# Patient Record
Sex: MALE | Race: WHITE | NOT HISPANIC OR LATINO | Employment: OTHER | URBAN - METROPOLITAN AREA
[De-identification: names, ages, dates, MRNs, and addresses within clinical notes are randomized per-mention and may not be internally consistent; named-entity substitution may affect disease eponyms.]

---

## 2017-04-03 ENCOUNTER — ALLSCRIPTS OFFICE VISIT (OUTPATIENT)
Dept: OTHER | Facility: OTHER | Age: 42
End: 2017-04-03

## 2017-04-03 DIAGNOSIS — M54.50 LOW BACK PAIN: ICD-10-CM

## 2017-04-03 DIAGNOSIS — E66.01 MORBID (SEVERE) OBESITY DUE TO EXCESS CALORIES (HCC): ICD-10-CM

## 2017-04-03 DIAGNOSIS — I10 ESSENTIAL (PRIMARY) HYPERTENSION: ICD-10-CM

## 2017-04-03 DIAGNOSIS — E78.5 HYPERLIPIDEMIA: ICD-10-CM

## 2017-05-22 ENCOUNTER — ALLSCRIPTS OFFICE VISIT (OUTPATIENT)
Dept: OTHER | Facility: OTHER | Age: 42
End: 2017-05-22

## 2018-01-12 VITALS
HEIGHT: 72 IN | OXYGEN SATURATION: 98 % | SYSTOLIC BLOOD PRESSURE: 150 MMHG | HEART RATE: 81 BPM | RESPIRATION RATE: 18 BRPM | BODY MASS INDEX: 42.66 KG/M2 | DIASTOLIC BLOOD PRESSURE: 100 MMHG | WEIGHT: 315 LBS | TEMPERATURE: 96.7 F

## 2018-01-14 NOTE — PROGRESS NOTES
Chief Complaint  CC patient here today for blood pressure check      Active Problems    1  Chronic low back pain (724 2,338 29) (M54 5,G89 29)   2  Hyperlipidemia (272 4) (E78 5)   3  Hypertension (401 9) (I10)   4  Morbid obesity (278 01) (E66 01)   5  Pain of left heel (729 5) (M79 672)   6  Seborrheic dermatitis (690 10) (L21 9)   7  Snoring (786 09) (R06 83)   8  Tear of right biceps muscle (840 8) (S46 111A)   9  Umbilical hernia (483 4) (K42 9)    Current Meds   1  HydroCHLOROthiazide 25 MG Oral Tablet; Take 1 tablet daily; Therapy: 71LQS3761 to (Evaluate:07Jan2017)  Requested for: 38TTQ7190; Last   Rx:[id number] Ordered   2  Lisinopril-Hydrochlorothiazide 20-25 MG Oral Tablet; TAKE 1 TABLET DAILY; Therapy: 62QNO3514 to (Evaluate:29Mar2018)  Requested for: 03Apr2017; Last   Rx:[id number] Ordered    Allergies    1  FRUIT    Vitals  Signs    Systolic: 705, RUE, Sitting  Diastolic: 839, RUE, Sitting   Systolic: 324, LUE, Sitting  Diastolic: 992, LUE, Sitting    Assessment    1  Hypertension (401 9) (I10)    Discussion/Summary    Blood pressure more elevated than goal  Would recommend changing blood pressure medication  He is concerned that his blood pressure will then go to low  On the hotter days he noticed his blood pressure had been running relatively low  We'll have him continue to watch his blood pressure at home and bring a list of his blood pressures in in about 2 weeks  If his blood pressure remains elevated at that point, would recommend switching to different blood pressure medication or titrating the dose of this one  Future Appointments    Date/Time Provider Specialty Site   10/03/2017 07:40 AM SAVANNAH Frausto   [address] [place] [address]     Signatures   Electronically signed by : SAVANNAH Chance ; May 22 2017 10:38PM EST                       (Author)

## 2018-01-22 VITALS — SYSTOLIC BLOOD PRESSURE: 148 MMHG | DIASTOLIC BLOOD PRESSURE: 100 MMHG

## 2018-09-07 ENCOUNTER — OFFICE VISIT (OUTPATIENT)
Dept: URGENT CARE | Facility: CLINIC | Age: 43
End: 2018-09-07

## 2018-09-07 VITALS
SYSTOLIC BLOOD PRESSURE: 168 MMHG | RESPIRATION RATE: 20 BRPM | DIASTOLIC BLOOD PRESSURE: 110 MMHG | TEMPERATURE: 97.4 F | WEIGHT: 315 LBS | HEIGHT: 70 IN | BODY MASS INDEX: 45.1 KG/M2 | HEART RATE: 106 BPM | OXYGEN SATURATION: 96 %

## 2018-09-07 DIAGNOSIS — H66.91 RIGHT OTITIS MEDIA, UNSPECIFIED OTITIS MEDIA TYPE: Primary | ICD-10-CM

## 2018-09-07 PROCEDURE — 99203 OFFICE O/P NEW LOW 30 MIN: CPT

## 2018-09-07 RX ORDER — AMOXICILLIN 500 MG/1
500 CAPSULE ORAL EVERY 8 HOURS SCHEDULED
Qty: 21 CAPSULE | Refills: 0 | Status: SHIPPED | OUTPATIENT
Start: 2018-09-07 | End: 2018-09-14

## 2018-09-07 NOTE — PROGRESS NOTES
[facility]        NAME: Edwar Reilly is a 40 y o  male  : 1975    MRN: [id number]  DATE: 2018  TIME: 3:53 PM    Assessment and Plan   Right otitis media, unspecified otitis media type [H66 91]  1  Right otitis media, unspecified otitis media type  amoxicillin (AMOXIL) 500 mg capsule         Patient Instructions     Right otitis media  Amoxicillin as directed  Follow up with PCP in 3-5 days  Proceed to  ER if symptoms worsen  Chief Complaint     Chief Complaint   Patient presents with    Earache     C/O pain in right ear x 4 days  History of Present Illness       Patient c/o right ear pain x 4 days  Patient denies fever, chills, n/v         Review of Systems   Review of Systems   HENT: Positive for ear pain  Negative for congestion, ear discharge, facial swelling, sinus pain, sinus pressure and sore throat  Respiratory: Negative  Cardiovascular: Negative  Current Medications       Current Outpatient Prescriptions:     amoxicillin (AMOXIL) 500 mg capsule, Take 1 capsule (500 mg total) by mouth every 8 (eight) hours for 7 days, Disp: 21 capsule, Rfl: 0    Current Allergies     Allergies as of 2018 - Reviewed 2018   Allergen Reaction Noted    Ascorbate Anaphylaxis 2016    Fruit extracts  2014            The following portions of the patient's history were reviewed and updated as appropriate: allergies, current medications, past family history, past medical history, past social history, past surgical history and problem list      Past Medical History:   Diagnosis Date    Hypertension        History reviewed  No pertinent surgical history  No family history on file  Medications have been verified          Objective   BP (!) 168/110   Pulse (!) 106   Temp (!) 97 4 °F (36 3 °C) (Tympanic)   Resp 20   Ht 5' 10" (1 778 m)   Wt (!) 144 kg (318 lb)   SpO2 96%   BMI 45 63 kg/m²        Physical Exam     Physical Exam Constitutional: He appears well-developed and well-nourished  No distress  HENT:   Head: Normocephalic and atraumatic  Right Ear: Hearing, tympanic membrane and ear canal normal  There is swelling and tenderness  Left Ear: Hearing, tympanic membrane, external ear and ear canal normal  No swelling or tenderness  Tympanic membrane is not injected, not scarred, not perforated, not erythematous and not retracted  Mouth/Throat: Uvula is midline, oropharynx is clear and moist and mucous membranes are normal    Eyes: Conjunctivae and EOM are normal  Pupils are equal, round, and reactive to light  Neck: Normal range of motion  Neck supple  Cardiovascular: Normal rate, regular rhythm, normal heart sounds and intact distal pulses  Pulmonary/Chest: Effort normal and breath sounds normal    Lymphadenopathy:     He has cervical adenopathy  Skin: He is not diaphoretic

## 2018-09-07 NOTE — PATIENT INSTRUCTIONS
Right otitis media  Amoxicillin as directed  Follow up with PCP in 3-5 days  Proceed to  ER if symptoms worsen    Otitis Media   WHAT YOU NEED TO KNOW:   Otitis media is an ear infection  DISCHARGE INSTRUCTIONS:   Medicines:  · Ibuprofen or acetaminophen  helps decrease your pain and fever  They are available without a doctor's order  Ask your healthcare provider which medicine is right for you  Ask how much to take and how often to take it  These medicines can cause stomach bleeding if not taken correctly  Ibuprofen can cause kidney damage  Do not take ibuprofen if you have kidney disease, an ulcer, or allergies to aspirin  Acetaminophen can cause liver damage  Do not drink alcohol if you take acetaminophen  · Ear drops  help treat your ear pain  · Antibiotics  help treat a bacterial infection that caused your ear infection  · Take your medicine as directed  Contact your healthcare provider if you think your medicine is not helping or if you have side effects  Tell him or her if you are allergic to any medicine  Keep a list of the medicines, vitamins, and herbs you take  Include the amounts, and when and why you take them  Bring the list or the pill bottles to follow-up visits  Carry your medicine list with you in case of an emergency  Heat or ice:   · Heat  may be used to decrease your pain  Place a warm, moist washcloth on your ear  Apply for 15 to 20 minutes, 3 to 4 times a day    · Ice  helps decrease swelling and pain  Use an ice pack or put crushed ice in a plastic bag  Cover the ice pack with a towel and place it on your ear for 15 to 20 minutes, 3 to 4 times a day for 2 days  Prevent otitis media:   · Wash your hands often  Use soap and water  Wash your hands after you use the bathroom, change a child's diapers, or sneeze  Wash your hands before you prepare or eat food  · Stay away from people who are ill  Some germs are easily and quickly spread through contact    Return to work or school: You may return to work or school when your fever is gone  Follow up with your healthcare provider as directed:  Write down your questions so you remember to ask them during your visits  Contact your healthcare provider if:   · Your ear pain gets worse or does not go away, even after treatment  · The outside of your ear is red or swollen  · You have vomiting or diarrhea  · You have fluid coming from your ear  · You have questions or concerns about your condition or care  Return to the emergency department if:   · You have a seizure  · You have a fever and a stiff neck  © 2017 [facility] Information is for End User's use only and may not be sold, redistributed or otherwise used for commercial purposes  All illustrations and images included in CareNotes® are the copyrighted property of A D A M [employer] Inc  or Shyam Watson  The above information is an  only  It is not intended as medical advice for individual conditions or treatments  Talk to your doctor, nurse or pharmacist before following any medical regimen to see if it is safe and effective for you

## 2020-10-09 PROBLEM — G89.29 CHRONIC LOW BACK PAIN: Status: ACTIVE | Noted: 2017-04-03

## 2020-10-09 PROBLEM — M54.50 CHRONIC LOW BACK PAIN: Status: ACTIVE | Noted: 2017-04-03

## 2020-10-10 ENCOUNTER — OFFICE VISIT (OUTPATIENT)
Dept: INTERNAL MEDICINE CLINIC | Facility: CLINIC | Age: 45
End: 2020-10-10

## 2020-10-10 VITALS
SYSTOLIC BLOOD PRESSURE: 182 MMHG | WEIGHT: 315 LBS | OXYGEN SATURATION: 97 % | HEIGHT: 72 IN | BODY MASS INDEX: 42.66 KG/M2 | HEART RATE: 88 BPM | DIASTOLIC BLOOD PRESSURE: 102 MMHG | TEMPERATURE: 97.3 F

## 2020-10-10 DIAGNOSIS — I10 ESSENTIAL HYPERTENSION: Primary | ICD-10-CM

## 2020-10-10 DIAGNOSIS — E78.2 MIXED HYPERLIPIDEMIA: ICD-10-CM

## 2020-10-10 DIAGNOSIS — E66.01 MORBID OBESITY WITH BMI OF 45.0-49.9, ADULT (HCC): ICD-10-CM

## 2020-10-10 PROCEDURE — 99203 OFFICE O/P NEW LOW 30 MIN: CPT | Performed by: FAMILY MEDICINE

## 2020-10-10 RX ORDER — HYDROCHLOROTHIAZIDE 25 MG/1
25 TABLET ORAL DAILY
Qty: 90 TABLET | Refills: 5 | Status: SHIPPED | OUTPATIENT
Start: 2020-10-10 | End: 2021-09-23

## 2021-09-23 ENCOUNTER — OFFICE VISIT (OUTPATIENT)
Dept: INTERNAL MEDICINE CLINIC | Facility: CLINIC | Age: 46
End: 2021-09-23
Payer: COMMERCIAL

## 2021-09-23 VITALS
HEART RATE: 95 BPM | HEIGHT: 72 IN | WEIGHT: 315 LBS | BODY MASS INDEX: 42.66 KG/M2 | DIASTOLIC BLOOD PRESSURE: 116 MMHG | OXYGEN SATURATION: 95 % | SYSTOLIC BLOOD PRESSURE: 224 MMHG | TEMPERATURE: 97.7 F

## 2021-09-23 DIAGNOSIS — I10 ESSENTIAL HYPERTENSION: Primary | ICD-10-CM

## 2021-09-23 DIAGNOSIS — E78.2 MIXED HYPERLIPIDEMIA: ICD-10-CM

## 2021-09-23 DIAGNOSIS — E66.01 MORBID OBESITY (HCC): ICD-10-CM

## 2021-09-23 PROCEDURE — 3725F SCREEN DEPRESSION PERFORMED: CPT | Performed by: FAMILY MEDICINE

## 2021-09-23 PROCEDURE — 99214 OFFICE O/P EST MOD 30 MIN: CPT | Performed by: FAMILY MEDICINE

## 2021-09-23 PROCEDURE — 1036F TOBACCO NON-USER: CPT | Performed by: FAMILY MEDICINE

## 2021-09-23 RX ORDER — LISINOPRIL AND HYDROCHLOROTHIAZIDE 25; 20 MG/1; MG/1
1 TABLET ORAL DAILY
Qty: 90 TABLET | Refills: 1 | Status: SHIPPED | OUTPATIENT
Start: 2021-09-23 | End: 2021-10-01 | Stop reason: SDUPTHER

## 2021-09-23 NOTE — PATIENT INSTRUCTIONS
the following:  · Headache    · Blurred vision    · Chest pain    · Dizziness or weakness    · Trouble breathing    · Nosebleeds    How is hypertension diagnosed? Your healthcare provider will take your blood pressure at several visits  You may also need to check your blood pressure at home  The provider will examine you and ask what medicines you take  He or she will also ask if you have a family history of high blood pressure and about any health conditions you have  He or she will also check your blood pressure and weight and examine your heart, lungs, and eyes  You may need any of the following tests:  · An ambulatory blood pressure monitor (ABPM)  is a device that you wear  ABPM measures your blood pressure while you do your regular daily activities  It records your blood pressure every 15 to 30 minutes during the day  It also records your blood pressure every 15 minutes to 1 hour at night  The recorded blood pressures help your healthcare provider know if you have hypertension not seen at your appointment  · Blood tests  may help healthcare providers find the cause of your hypertension  Blood tests can also help find other health problems caused by hypertension  · Urine tests  will be done to check your kidney function  Kidney problems can increase your risk for hypertension  Which medicines are used to treat hypertension? · Antihypertensives  may be used to help lower your blood pressure  Several kinds of medicines are available  Your healthcare provider will choose medicines based on the kind of hypertension you have  You may need more than one type of medicine  Take the medicine exactly as directed  · Diuretics  help decrease extra fluid that collects in your body  This will help lower your blood pressure  You may urinate more often while you take this medicine  · Cholesterol medicine  helps lower your cholesterol level   A low cholesterol level helps prevent heart disease and makes it easier to control your blood pressure  What can I do to manage hypertension? · Check your blood pressure at home  Avoid smoking, caffeine, and exercise at least 30 minutes before checking your blood pressure  Sit and rest for 5 minutes before you take your blood pressure  Extend your arm and support it on a flat surface  Your arm should be at the same level as your heart  Follow the directions that came with your blood pressure monitor  Check your blood pressure 2 times, 1 minute apart, before you take your medicine in the morning  Also check your blood pressure before your evening meal  Keep a record of your readings and bring it to your follow-up visits  Ask your healthcare provider what your blood pressure should be  · Manage any other health conditions you have  Health conditions such as diabetes can increase your risk for hypertension  Follow your healthcare provider's instructions and take all your medicines as directed  · Ask about all medicines  Certain medicines can increase your blood pressure  Examples include oral birth control pills, decongestants, herbal supplements, and NSAIDs, such as ibuprofen  Your healthcare provider can tell you which medicines are safe for you to take  This includes prescription and over-the-counter medicines  What lifestyle changes can I make to manage hypertension? Your healthcare provider may recommend you work with a team to manage hypertension  The team may include medical experts such as a dietitian, an exercise or physical therapist, and a behavior therapist  Your family members may be included in helping you create lifestyle changes  · Limit sodium (salt) as directed  Too much sodium can affect your fluid balance  Check labels to find low-sodium or no-salt-added foods  Some low-sodium foods use potassium salts for flavor  Too much potassium can also cause health problems   Your healthcare provider will tell you how much sodium and potassium are safe for you to have in a day  He or she may recommend that you limit sodium to 2,300 mg a day  · Follow the meal plan recommended by your healthcare provider  A dietitian or your provider can give you more information on low-sodium plans or the DASH (Dietary Approaches to Stop Hypertension) eating plan  The DASH plan is low in sodium, processed sugar, unhealthy fats, and total fat  It is high in potassium, calcium, and fiber  These can be found in vegetables, fruit, and whole-grain foods  · Be physically active throughout the day  Physical activity, such as exercise, can help control your blood pressure and your weight  Be physically active for at least 30 minutes per day, on most days of the week  Include aerobic activity, such as walking or riding a bicycle  Also include strength training at least 2 times each week  Your healthcare providers can help you create a physical activity plan  · Decrease stress  This may help lower your blood pressure  Learn ways to relax, such as deep breathing or listening to music  · Limit alcohol as directed  Alcohol can increase your blood pressure  A drink of alcohol is 12 ounces of beer, 5 ounces of wine, or 1½ ounces of liquor  · Do not smoke  Nicotine and other chemicals in cigarettes and cigars can increase your blood pressure and also cause lung damage  Ask your healthcare provider for information if you currently smoke and need help to quit  E-cigarettes or smokeless tobacco still contain nicotine  Talk to your healthcare provider before you use these products  Call your local emergency number (911 in the [address] [place] [address],[address]) or have someone call if:   · You have chest pain  · You have any of the following signs of a heart attack:      ?  Squeezing, pressure, or pain in your chest    ? You may  also have any of the following:     § Discomfort or pain in your back, neck, jaw, stomach, or arm    § Shortness of breath    § Nausea or vomiting    § Lightheadedness or a sudden cold sweat    · You become confused or have trouble speaking  · You suddenly feel lightheaded or have trouble breathing  When should I seek immediate care? · You have a severe headache or vision loss  · You have weakness in an arm or leg  When should I call my doctor? · You feel faint, dizzy, confused, or drowsy  · You have been taking your blood pressure medicine but your pressure is higher than your provider says it should be  · You have questions or concerns about your condition or care  CARE AGREEMENT:   You have the right to help plan your care  Learn about your health condition and how it may be treated  Discuss treatment options with your healthcare providers to decide what care you want to receive  You always have the right to refuse treatment  The above information is an  only  It is not intended as medical advice for individual conditions or treatments  Talk to your doctor, nurse or pharmacist before following any medical regimen to see if it is safe and effective for you  © Copyright [employer] 2021 Information is for End User's use only and may not be sold, redistributed or otherwise used for commercial purposes   All illustrations and images included in CareNotes® are the copyrighted property of A D A M , Inc  or [address] [place] [address]

## 2021-09-23 NOTE — PROGRESS NOTES
Assessment/Plan:    No problem-specific Assessment & Plan notes found for this encounter  Diagnoses and all orders for this visit:    Essential hypertension  -     CBC and differential; Future  -     Comprehensive metabolic panel; Future  -     Lipid panel; Future  -     TSH, 3rd generation; Future  -     lisinopril-hydrochlorothiazide (PRINZIDE,ZESTORETIC) 20-25 MG per tablet; Take 1 tablet by mouth daily    Mixed hyperlipidemia  -     CBC and differential; Future  -     Comprehensive metabolic panel; Future  -     Lipid panel; Future  -     TSH, 3rd generation; Future    Morbid obesity (HCC)  -     CBC and differential; Future  -     Comprehensive metabolic panel; Future  -     Lipid panel; Future  -     TSH, 3rd generation; Future       Orders recommendations as noted above  Discussed with him the importance of following up more regularly  Discussed the importance of getting laboratory testing completed since it has been quite awhile and especially with his blood pressure being so significantly elevated  Follow blood pressure at home  Warning signs and symptoms of blood pressure abnormalities discussed  Advised him not to stop medications without 1st contacting the office since significantly elevated blood pressures to carry risk  Discussed with him that with his blood pressure being this elevated emergency room evaluation could be indicated  He would like to avoid this if possible  Will start him on the lisinopril / hydrochlorothiazide  Watch salt intake  Stay adequately hydrated  Cholesterol has been elevated in the past   Advised him to get laboratory testing done over the next week as noted above  Weight loss would definitely help with his underlying blood pressure issues  Slow but steady weight loss recommended  Flu shot and COVID vaccination discussed  The significant benefits of these discussed  He adamantly declines these, however    Will have him follow up for blood pressure check over the next week  Advised him to call us with blood pressure readings at home as well  Subjective:      Patient ID: Basilio Patrick is a 41 y o  male  He presents for follow-up on blood pressure  He was seen about 11 months ago and blood pressure was significantly elevated at that time  He had been off of his blood pressure medications  These were resumed  He had than been having persistently elevated blood pressures and amlodipine was added  He began to feel increasing fatigue and orthostatics symptoms  He had discontinued the medication  He has not been taking anything for his blood pressure for months  Occasional headaches but not severe  Denies any numbness, tingling, or weakness  Denies any vision changes  Denies any abdominal pain  Denies any chest pain or palpitations  Denies any significant shortness of breath  Appetite has been stable  Denies any nausea, vomiting, or diarrhea  Denies any changes in bowel movements  The following portions of the patient's history were reviewed and updated as appropriate:   He  has a past medical history of Hypertension  He   Patient Active Problem List    Diagnosis Date Noted    Chronic low back pain 04/03/2017    Hyperlipidemia 09/13/2016    Morbid obesity ([address] ) 03/21/2016    Seborrheic dermatitis 03/21/2016    Hypertension 04/28/2014     He  has no past surgical history on file  His family history is not on file  He  reports that he has never smoked  He has never used smokeless tobacco  No history on file for alcohol use and drug use  Current Outpatient Medications   Medication Sig Dispense Refill    lisinopril-hydrochlorothiazide (PRINZIDE,ZESTORETIC) 20-25 MG per tablet Take 1 tablet by mouth daily 90 tablet 1     No current facility-administered medications for this visit       Current Outpatient Medications on File Prior to Visit   Medication Sig    [DISCONTINUED] hydrochlorothiazide (HYDRODIURIL) 25 mg tablet Take 1 tablet (25 mg total) by mouth daily (Patient not taking: Reported on 9/23/2021)     No current facility-administered medications on file prior to visit  He is allergic to ascorbate - food allergy and fruit extracts       Review of Systems   Constitutional: Negative for activity change, appetite change, chills and fever  HENT: Negative for hearing loss  Eyes: Negative for pain and visual disturbance  Respiratory: Negative for cough, chest tightness and shortness of breath  Cardiovascular: Negative for chest pain and palpitations  Gastrointestinal: Negative for abdominal pain, blood in stool, diarrhea, nausea and vomiting  Endocrine: Negative for polydipsia, polyphagia and polyuria  Genitourinary: Negative for dysuria  Musculoskeletal: Negative for arthralgias and gait problem  Skin: Negative for color change  Neurological: Negative for dizziness, tremors, speech difficulty, weakness, light-headedness, numbness and headaches  Occasional headaches   Hematological: Does not bruise/bleed easily  Psychiatric/Behavioral: Negative for behavioral problems, decreased concentration and dysphoric mood  The patient is not nervous/anxious  Objective:      BP (!) 224/116 (BP Location: Left arm, Patient Position: Sitting, Cuff Size: Large)   Pulse 95   Temp 97 7 °F (36 5 °C)   Ht 6' (1 829 m)   Wt (!) 162 kg (356 lb 12 8 oz)   SpO2 95%   BMI 48 39 kg/m²          Physical Exam  Vitals and nursing note reviewed  Constitutional:       General: He is not in acute distress  Appearance: He is well-developed and well-groomed  He is morbidly obese  HENT:      Head: Normocephalic and atraumatic  Nose: Nose normal    Eyes:      Conjunctiva/sclera: Conjunctivae normal       Pupils: Pupils are equal, round, and reactive to light  Neck:      Vascular: No carotid bruit  Cardiovascular:      Rate and Rhythm: Normal rate and regular rhythm  Heart sounds: Normal heart sounds   No murmur heard    No friction rub  No gallop  Pulmonary:      Breath sounds: No wheezing or rales  Chest:      Chest wall: No tenderness  Abdominal:      General: There is no distension  Tenderness: There is no abdominal tenderness  There is no guarding or rebound  Lymphadenopathy:      Cervical: No cervical adenopathy  Skin:     General: Skin is warm and dry  Neurological:      Mental Status: He is alert and oriented to person, place, and time  Psychiatric:         Mood and Affect: Mood and affect normal          Behavior: Behavior normal            BMI Counseling: Body mass index is 48 39 kg/m²  The BMI is above normal  Nutrition recommendations include decreasing portion sizes, encouraging healthy choices of fruits and vegetables, decreasing fast food intake, consuming healthier snacks, limiting drinks that contain sugar, moderation in carbohydrate intake and reducing intake of cholesterol  Exercise recommendations include exercising 3-5 times per week  Rationale for BMI follow-up plan is due to patient being overweight or obese  Depression Screening and Follow-up Plan:   Patient was screened for depression during today's encounter  They screened negative with a PHQ-2 score of 0

## 2021-09-30 ENCOUNTER — OFFICE VISIT (OUTPATIENT)
Dept: INTERNAL MEDICINE CLINIC | Facility: CLINIC | Age: 46
End: 2021-09-30
Payer: COMMERCIAL

## 2021-09-30 VITALS — DIASTOLIC BLOOD PRESSURE: 106 MMHG | SYSTOLIC BLOOD PRESSURE: 182 MMHG | HEART RATE: 84 BPM | OXYGEN SATURATION: 98 %

## 2021-09-30 DIAGNOSIS — I10 PRIMARY HYPERTENSION: Primary | ICD-10-CM

## 2021-09-30 PROCEDURE — 99211 OFF/OP EST MAY X REQ PHY/QHP: CPT | Performed by: FAMILY MEDICINE

## 2021-10-01 ENCOUNTER — TELEPHONE (OUTPATIENT)
Dept: INTERNAL MEDICINE CLINIC | Facility: CLINIC | Age: 46
End: 2021-10-01

## 2021-10-01 DIAGNOSIS — I10 ESSENTIAL HYPERTENSION: ICD-10-CM

## 2021-10-01 RX ORDER — LISINOPRIL AND HYDROCHLOROTHIAZIDE 25; 20 MG/1; MG/1
1 TABLET ORAL DAILY
Qty: 90 TABLET | Refills: 1 | Status: CANCELLED | OUTPATIENT
Start: 2021-10-01

## 2021-10-01 RX ORDER — LISINOPRIL AND HYDROCHLOROTHIAZIDE 25; 20 MG/1; MG/1
1 TABLET ORAL DAILY
Qty: 4 TABLET | Refills: 0 | Status: SHIPPED | OUTPATIENT
Start: 2021-10-01 | End: 2021-12-18 | Stop reason: SDUPTHER

## 2021-10-07 ENCOUNTER — TELEMEDICINE (OUTPATIENT)
Dept: INTERNAL MEDICINE CLINIC | Facility: CLINIC | Age: 46
End: 2021-10-07
Payer: COMMERCIAL

## 2021-10-07 VITALS
OXYGEN SATURATION: 95 % | SYSTOLIC BLOOD PRESSURE: 160 MMHG | DIASTOLIC BLOOD PRESSURE: 111 MMHG | HEART RATE: 88 BPM | TEMPERATURE: 99.2 F

## 2021-10-07 DIAGNOSIS — I10 PRIMARY HYPERTENSION: ICD-10-CM

## 2021-10-07 DIAGNOSIS — B34.9 VIRAL INFECTION, UNSPECIFIED: ICD-10-CM

## 2021-10-07 DIAGNOSIS — Z20.822 EXPOSURE TO COVID-19 VIRUS: Primary | ICD-10-CM

## 2021-10-07 PROCEDURE — 99213 OFFICE O/P EST LOW 20 MIN: CPT | Performed by: FAMILY MEDICINE

## 2021-10-07 PROCEDURE — U0003 INFECTIOUS AGENT DETECTION BY NUCLEIC ACID (DNA OR RNA); SEVERE ACUTE RESPIRATORY SYNDROME CORONAVIRUS 2 (SARS-COV-2) (CORONAVIRUS DISEASE [COVID-19]), AMPLIFIED PROBE TECHNIQUE, MAKING USE OF HIGH THROUGHPUT TECHNOLOGIES AS DESCRIBED BY CMS-2020-01-R: HCPCS | Performed by: FAMILY MEDICINE

## 2021-10-07 PROCEDURE — 3080F DIAST BP >= 90 MM HG: CPT | Performed by: FAMILY MEDICINE

## 2021-10-07 PROCEDURE — U0005 INFEC AGEN DETEC AMPLI PROBE: HCPCS | Performed by: FAMILY MEDICINE

## 2021-10-07 PROCEDURE — 3077F SYST BP >= 140 MM HG: CPT | Performed by: FAMILY MEDICINE

## 2021-10-07 RX ORDER — AMLODIPINE BESYLATE 5 MG/1
5 TABLET ORAL DAILY
Qty: 90 TABLET | Refills: 3 | Status: SHIPPED | OUTPATIENT
Start: 2021-10-07

## 2021-10-08 ENCOUNTER — TELEPHONE (OUTPATIENT)
Dept: INTERNAL MEDICINE CLINIC | Facility: CLINIC | Age: 46
End: 2021-10-08

## 2021-10-08 ENCOUNTER — TELEMEDICINE (OUTPATIENT)
Dept: INTERNAL MEDICINE CLINIC | Facility: CLINIC | Age: 46
End: 2021-10-08
Payer: COMMERCIAL

## 2021-10-08 DIAGNOSIS — U07.1 COVID-19: Primary | ICD-10-CM

## 2021-10-08 LAB — SARS-COV-2 RNA RESP QL NAA+PROBE: POSITIVE

## 2021-10-08 PROCEDURE — 1036F TOBACCO NON-USER: CPT | Performed by: NURSE PRACTITIONER

## 2021-10-08 PROCEDURE — 99213 OFFICE O/P EST LOW 20 MIN: CPT | Performed by: NURSE PRACTITIONER

## 2021-10-08 RX ORDER — ONDANSETRON 2 MG/ML
4 INJECTION INTRAMUSCULAR; INTRAVENOUS ONCE AS NEEDED
Status: CANCELLED | OUTPATIENT
Start: 2021-10-08

## 2021-10-08 RX ORDER — ACETAMINOPHEN 325 MG/1
650 TABLET ORAL ONCE AS NEEDED
Status: CANCELLED | OUTPATIENT
Start: 2021-10-08

## 2021-10-08 RX ORDER — ALBUTEROL SULFATE 90 UG/1
3 AEROSOL, METERED RESPIRATORY (INHALATION) ONCE AS NEEDED
Status: CANCELLED | OUTPATIENT
Start: 2021-10-08

## 2021-10-08 RX ORDER — SODIUM CHLORIDE 9 MG/ML
20 INJECTION, SOLUTION INTRAVENOUS ONCE
Status: CANCELLED | OUTPATIENT
Start: 2021-10-08

## 2021-12-18 ENCOUNTER — NURSE TRIAGE (OUTPATIENT)
Dept: OTHER | Facility: OTHER | Age: 46
End: 2021-12-18

## 2021-12-18 DIAGNOSIS — I10 ESSENTIAL HYPERTENSION: ICD-10-CM

## 2021-12-18 RX ORDER — LISINOPRIL AND HYDROCHLOROTHIAZIDE 25; 20 MG/1; MG/1
1 TABLET ORAL DAILY
Qty: 7 TABLET | Refills: 0 | Status: SHIPPED | OUTPATIENT
Start: 2021-12-18 | End: 2021-12-20 | Stop reason: SDUPTHER

## 2022-04-18 DIAGNOSIS — I10 ESSENTIAL HYPERTENSION: ICD-10-CM

## 2022-04-18 RX ORDER — LISINOPRIL AND HYDROCHLOROTHIAZIDE 25; 20 MG/1; MG/1
TABLET ORAL
Qty: 90 TABLET | Refills: 3 | Status: SHIPPED | OUTPATIENT
Start: 2022-04-18

## 2022-10-19 DIAGNOSIS — I10 PRIMARY HYPERTENSION: ICD-10-CM

## 2022-10-20 RX ORDER — AMLODIPINE BESYLATE 5 MG/1
5 TABLET ORAL DAILY
Qty: 90 TABLET | Refills: 3 | Status: SHIPPED | OUTPATIENT
Start: 2022-10-20

## 2023-04-18 ENCOUNTER — TELEPHONE (OUTPATIENT)
Dept: INTERNAL MEDICINE CLINIC | Facility: CLINIC | Age: 48
End: 2023-04-18

## 2023-04-18 DIAGNOSIS — I10 ESSENTIAL HYPERTENSION: Primary | ICD-10-CM

## 2023-04-18 DIAGNOSIS — E78.2 MIXED HYPERLIPIDEMIA: ICD-10-CM

## 2023-04-18 NOTE — TELEPHONE ENCOUNTER
Pt is scheduled for May 3  He would like to get abs done prior to his appt  There are not any in his chart

## 2023-05-02 ENCOUNTER — APPOINTMENT (OUTPATIENT)
Dept: LAB | Facility: HOSPITAL | Age: 48
End: 2023-05-02

## 2023-05-02 DIAGNOSIS — E78.2 MIXED HYPERLIPIDEMIA: ICD-10-CM

## 2023-05-02 DIAGNOSIS — I10 ESSENTIAL HYPERTENSION: ICD-10-CM

## 2023-05-02 LAB
ALBUMIN SERPL BCP-MCNC: 4.5 G/DL (ref 3.5–5)
ALP SERPL-CCNC: 45 U/L (ref 34–104)
ALT SERPL W P-5'-P-CCNC: 25 U/L (ref 7–52)
ANION GAP SERPL CALCULATED.3IONS-SCNC: 10 MMOL/L (ref 4–13)
AST SERPL W P-5'-P-CCNC: 20 U/L (ref 13–39)
BASOPHILS # BLD AUTO: 0.05 THOUSANDS/ΜL (ref 0–0.1)
BASOPHILS NFR BLD AUTO: 1 % (ref 0–1)
BILIRUB SERPL-MCNC: 0.77 MG/DL (ref 0.2–1)
BUN SERPL-MCNC: 16 MG/DL (ref 5–25)
CALCIUM SERPL-MCNC: 10 MG/DL (ref 8.4–10.2)
CHLORIDE SERPL-SCNC: 99 MMOL/L (ref 96–108)
CHOLEST SERPL-MCNC: 194 MG/DL
CO2 SERPL-SCNC: 30 MMOL/L (ref 21–32)
CREAT SERPL-MCNC: 0.83 MG/DL (ref 0.6–1.3)
EOSINOPHIL # BLD AUTO: 0.23 THOUSAND/ΜL (ref 0–0.61)
EOSINOPHIL NFR BLD AUTO: 3 % (ref 0–6)
ERYTHROCYTE [DISTWIDTH] IN BLOOD BY AUTOMATED COUNT: 12.9 % (ref 11.6–15.1)
GFR SERPL CREATININE-BSD FRML MDRD: 105 ML/MIN/1.73SQ M
GLUCOSE P FAST SERPL-MCNC: 110 MG/DL (ref 65–99)
HCT VFR BLD AUTO: 48.1 % (ref 36.5–49.3)
HDLC SERPL-MCNC: 44 MG/DL
HGB BLD-MCNC: 15.7 G/DL (ref 12–17)
IMM GRANULOCYTES # BLD AUTO: 0.02 THOUSAND/UL (ref 0–0.2)
IMM GRANULOCYTES NFR BLD AUTO: 0 % (ref 0–2)
LDLC SERPL CALC-MCNC: 116 MG/DL (ref 0–100)
LYMPHOCYTES # BLD AUTO: 1.89 THOUSANDS/ΜL (ref 0.6–4.47)
LYMPHOCYTES NFR BLD AUTO: 28 % (ref 14–44)
MCH RBC QN AUTO: 28.9 PG (ref 26.8–34.3)
MCHC RBC AUTO-ENTMCNC: 32.6 G/DL (ref 31.4–37.4)
MCV RBC AUTO: 89 FL (ref 82–98)
MONOCYTES # BLD AUTO: 0.68 THOUSAND/ΜL (ref 0.17–1.22)
MONOCYTES NFR BLD AUTO: 10 % (ref 4–12)
NEUTROPHILS # BLD AUTO: 3.89 THOUSANDS/ΜL (ref 1.85–7.62)
NEUTS SEG NFR BLD AUTO: 58 % (ref 43–75)
NONHDLC SERPL-MCNC: 150 MG/DL
NRBC BLD AUTO-RTO: 0 /100 WBCS
PLATELET # BLD AUTO: 310 THOUSANDS/UL (ref 149–390)
PMV BLD AUTO: 8.2 FL (ref 8.9–12.7)
POTASSIUM SERPL-SCNC: 4.3 MMOL/L (ref 3.5–5.3)
PROT SERPL-MCNC: 7.8 G/DL (ref 6.4–8.4)
RBC # BLD AUTO: 5.43 MILLION/UL (ref 3.88–5.62)
SODIUM SERPL-SCNC: 139 MMOL/L (ref 135–147)
TRIGL SERPL-MCNC: 168 MG/DL
TSH SERPL DL<=0.05 MIU/L-ACNC: 1.63 UIU/ML (ref 0.45–4.5)
WBC # BLD AUTO: 6.76 THOUSAND/UL (ref 4.31–10.16)

## 2023-05-03 ENCOUNTER — OFFICE VISIT (OUTPATIENT)
Dept: INTERNAL MEDICINE CLINIC | Facility: CLINIC | Age: 48
End: 2023-05-03

## 2023-05-03 VITALS
DIASTOLIC BLOOD PRESSURE: 102 MMHG | TEMPERATURE: 98.6 F | HEIGHT: 70 IN | BODY MASS INDEX: 45.1 KG/M2 | HEART RATE: 97 BPM | WEIGHT: 315 LBS | SYSTOLIC BLOOD PRESSURE: 178 MMHG | OXYGEN SATURATION: 96 %

## 2023-05-03 DIAGNOSIS — I10 PRIMARY HYPERTENSION: ICD-10-CM

## 2023-05-03 DIAGNOSIS — R73.02 IMPAIRED GLUCOSE TOLERANCE: Primary | ICD-10-CM

## 2023-05-03 DIAGNOSIS — E66.01 MORBID OBESITY (HCC): ICD-10-CM

## 2023-05-03 DIAGNOSIS — I10 ESSENTIAL HYPERTENSION: ICD-10-CM

## 2023-05-03 DIAGNOSIS — E78.2 MIXED HYPERLIPIDEMIA: ICD-10-CM

## 2023-05-03 DIAGNOSIS — Z12.11 SCREENING FOR COLON CANCER: ICD-10-CM

## 2023-05-03 PROBLEM — U07.1 COVID-19: Status: RESOLVED | Noted: 2021-10-08 | Resolved: 2023-05-03

## 2023-05-03 RX ORDER — LISINOPRIL AND HYDROCHLOROTHIAZIDE 25; 20 MG/1; MG/1
1 TABLET ORAL DAILY
Qty: 90 TABLET | Refills: 1 | Status: SHIPPED | OUTPATIENT
Start: 2023-05-03

## 2023-05-03 RX ORDER — AMLODIPINE BESYLATE 2.5 MG/1
7.5 TABLET ORAL DAILY
Qty: 270 TABLET | Refills: 1 | Status: SHIPPED | OUTPATIENT
Start: 2023-05-03

## 2023-05-03 NOTE — PROGRESS NOTES
Assessment/Plan:    No problem-specific Assessment & Plan notes found for this encounter  Diagnoses and all orders for this visit:    Impaired glucose tolerance  -     Comprehensive metabolic panel; Future    Primary hypertension  -     amLODIPine (NORVASC) 2 5 mg tablet; Take 3 tablets (7 5 mg total) by mouth daily  -     CBC and differential; Future    Essential hypertension  -     lisinopril-hydrochlorothiazide (PRINZIDE,ZESTORETIC) 20-25 MG per tablet; Take 1 tablet by mouth daily  -     Comprehensive metabolic panel; Future    Morbid obesity ([address] )    Mixed hyperlipidemia  -     Comprehensive metabolic panel; Future  -     Lipid panel; Future  -     TSH, 3rd generation; Future    Screening for colon cancer  -     Cologuard   Orders and recommendations as noted above  Recent laboratory testing reviewed with him  Reviewed cholesterol with him  Discussed goal of an LDL less than 100, HDL greater than 40, and triglycerides less than 150  Watch diet  Increase fiber in diet  Wishes to hold off on statin medications if possible  Is concerned about potentially increasing his blood pressure medication  Has had some issues with orthostatic hypotension in the past with blood pressure medication changes  We will increase his amlodipine to 7 5 mg  Follow blood pressure at home  Advised him to call in a few weeks to give us an update and will increase the dose of the amlodipine if needed thereafter  Continue with the lisinopril/hydrochlorothiazide  Stay adequately hydrated  Slow but steady weight loss recommended  Blood sugar mildly elevated  Watch carbohydrate intake  Is agreeable to Cologuard  Prescription provided  Discussed with him trying to follow-up more regularly at least every 6 months but sooner if blood pressure remains elevated at home  Subjective:      Patient ID: Areli Cavazos is a 56 y o  male  He presents for routine follow-up    Has been quite a while since he has had a routine visit  Has generally been doing well  Continues to work and 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  Takes his blood pressure medications regularly  Denies any headaches or localized weakness  Denies any chest pain or palpitations  Tries to watch his diet  Is trying to lose some weight  Appetite remains stable  Denies any nausea, vomiting, or diarrhea  Denies any changes in bowel movements  Usually sleeps relatively well  The following portions of the patient's history were reviewed and updated as appropriate:   He  has a past medical history of Hypertension  He   Patient Active Problem List    Diagnosis Date Noted    Impaired glucose tolerance 05/03/2023    Chronic low back pain 04/03/2017    Hyperlipidemia 09/13/2016    Morbid obesity ([facility] [address] ) 03/21/2016    Seborrheic dermatitis 03/21/2016    Hypertension 04/28/2014     He  has no past surgical history on file  His family history is not on file  He  reports that he has never smoked  He has never used smokeless tobacco  No history on file for alcohol use and drug use  Current Outpatient Medications   Medication Sig Dispense Refill    amLODIPine (NORVASC) 2 5 mg tablet Take 3 tablets (7 5 mg total) by mouth daily 270 tablet 1    lisinopril-hydrochlorothiazide (PRINZIDE,ZESTORETIC) 20-25 MG per tablet Take 1 tablet by mouth daily 90 tablet 1     No current facility-administered medications for this visit  No current outpatient medications on file prior to visit  No current facility-administered medications on file prior to visit  He is allergic to ascorbate - food allergy and fruit extracts       Review of Systems   Constitutional: Negative for activity change, appetite change, chills and fever  HENT: Negative for hearing loss  Eyes: Negative for pain and visual disturbance  Respiratory: Negative for chest tightness and shortness of breath  Cardiovascular: Negative for chest pain and palpitations  "  Gastrointestinal: Negative for abdominal pain, blood in stool, diarrhea, nausea and vomiting  Endocrine: Negative for polydipsia, polyphagia and polyuria  Genitourinary: Negative for dysuria  Musculoskeletal: Positive for arthralgias  Negative for gait problem  Skin: Negative for color change  Neurological: Negative for dizziness and headaches  Hematological: Does not bruise/bleed easily  Psychiatric/Behavioral: Negative for behavioral problems  The patient is not nervous/anxious  Objective:      BP (!) 178/102 (BP Location: Left arm, Patient Position: Sitting, Cuff Size: Large)   Pulse 97   Temp 98 6 °F (37 °C)   Ht 5' 10\" (1 778 m)   Wt (!) 159 kg (349 lb 12 8 oz)   SpO2 96%   BMI 50 19 kg/m²          Physical Exam  Vitals and nursing note reviewed  Constitutional:       General: He is not in acute distress  Appearance: He is well-developed and well-groomed  HENT:      Head: Normocephalic and atraumatic  Nose: Nose normal    Eyes:      Conjunctiva/sclera: Conjunctivae normal       Pupils: Pupils are equal, round, and reactive to light  Cardiovascular:      Rate and Rhythm: Normal rate and regular rhythm  Heart sounds: Normal heart sounds  No murmur heard  No friction rub  No gallop  Pulmonary:      Breath sounds: No wheezing or rales  Chest:      Chest wall: No tenderness  Abdominal:      General: There is no distension  Tenderness: There is no abdominal tenderness  There is no guarding or rebound  Lymphadenopathy:      Cervical: No cervical adenopathy  Skin:     General: Skin is warm and dry  Neurological:      Mental Status: He is alert and oriented to person, place, and time  Psychiatric:         Mood and Affect: Mood and affect normal          Speech: Speech normal          Behavior: Behavior normal  Behavior is cooperative  Cognition and Memory: Cognition and memory normal          BMI Counseling: Body mass index is 50 19 kg/m²   " The BMI is above normal  Nutrition recommendations include decreasing portion sizes, encouraging healthy choices of fruits and vegetables, decreasing fast food intake, consuming healthier snacks, limiting drinks that contain sugar, moderation in carbohydrate intake and reducing intake of cholesterol  Exercise recommendations include exercising 3-5 times per week  Rationale for BMI follow-up plan is due to patient being overweight or obese  Below is the patient's most recent value for Albumin, ALT, AST, BUN, Calcium, Chloride, Cholesterol, CO2, Creatinine, GFR, Glucose, HDL, Hematocrit, Hemoglobin, Hemoglobin A1C, LDL, Magnesium, Phosphorus, Platelets, Potassium, PSA, Sodium, Triglycerides, and WBC  Lab Results   Component Value Date    ALT 25 05/02/2023    AST 20 05/02/2023    BUN 16 05/02/2023    CALCIUM 10 0 05/02/2023    CL 99 05/02/2023    CHOL 214 05/13/2014    CO2 30 05/02/2023    CREATININE 0 83 05/02/2023    HDL 44 05/02/2023    HCT 48 1 05/02/2023    HGB 15 7 05/02/2023    HGBA1C 5 3 09/12/2016     05/02/2023    K 4 3 05/02/2023     05/13/2014    TRIG 168 (H) 05/02/2023    WBC 6 76 05/02/2023     Note: for a comprehensive list of the patient's lab results, access the Results Review activity

## 2023-08-15 ENCOUNTER — VBI (OUTPATIENT)
Dept: ADMINISTRATIVE | Facility: OTHER | Age: 48
End: 2023-08-15

## 2023-11-02 ENCOUNTER — OFFICE VISIT (OUTPATIENT)
Dept: INTERNAL MEDICINE CLINIC | Facility: CLINIC | Age: 48
End: 2023-11-02
Payer: COMMERCIAL

## 2023-11-02 VITALS
SYSTOLIC BLOOD PRESSURE: 142 MMHG | BODY MASS INDEX: 45.1 KG/M2 | OXYGEN SATURATION: 98 % | HEIGHT: 70 IN | HEART RATE: 73 BPM | WEIGHT: 315 LBS | DIASTOLIC BLOOD PRESSURE: 86 MMHG

## 2023-11-02 DIAGNOSIS — I10 PRIMARY HYPERTENSION: Primary | ICD-10-CM

## 2023-11-02 DIAGNOSIS — G89.29 CHRONIC MIDLINE LOW BACK PAIN WITHOUT SCIATICA: ICD-10-CM

## 2023-11-02 DIAGNOSIS — K42.9 UMBILICAL HERNIA WITHOUT OBSTRUCTION AND WITHOUT GANGRENE: ICD-10-CM

## 2023-11-02 DIAGNOSIS — I10 ESSENTIAL HYPERTENSION: ICD-10-CM

## 2023-11-02 DIAGNOSIS — H71.93 CHOLESTEATOMA OF BOTH EARS: ICD-10-CM

## 2023-11-02 DIAGNOSIS — R73.02 IMPAIRED GLUCOSE TOLERANCE: ICD-10-CM

## 2023-11-02 DIAGNOSIS — E78.2 MIXED HYPERLIPIDEMIA: ICD-10-CM

## 2023-11-02 DIAGNOSIS — E66.01 MORBID OBESITY (HCC): ICD-10-CM

## 2023-11-02 DIAGNOSIS — M54.50 CHRONIC MIDLINE LOW BACK PAIN WITHOUT SCIATICA: ICD-10-CM

## 2023-11-02 PROBLEM — H71.90 CHOLESTEATOMA: Status: ACTIVE | Noted: 2023-11-02

## 2023-11-02 PROCEDURE — 99214 OFFICE O/P EST MOD 30 MIN: CPT | Performed by: FAMILY MEDICINE

## 2023-11-02 RX ORDER — AMLODIPINE BESYLATE 2.5 MG/1
7.5 TABLET ORAL DAILY
Qty: 270 TABLET | Refills: 1 | Status: SHIPPED | OUTPATIENT
Start: 2023-11-02

## 2023-11-02 RX ORDER — LISINOPRIL AND HYDROCHLOROTHIAZIDE 25; 20 MG/1; MG/1
1 TABLET ORAL DAILY
Qty: 90 TABLET | Refills: 1 | Status: SHIPPED | OUTPATIENT
Start: 2023-11-02

## 2023-11-02 NOTE — PROGRESS NOTES
Assessment/Plan:       1. Primary hypertension  Assessment & Plan:  Blood pressure relatively well controlled. Follow blood pressure at home if possible. Watch salt intake. Continue with the amlodipine and the lisinopril/hydrochlorothiazide. Slow but steady weight loss recommended. Orders:  -     CBC and differential; Future  -     Comprehensive metabolic panel; Future  -     amLODIPine (NORVASC) 2.5 mg tablet; Take 3 tablets (7.5 mg total) by mouth daily    2. Essential hypertension  -     CBC and differential; Future  -     lisinopril-hydrochlorothiazide (PRINZIDE,ZESTORETIC) 20-25 MG per tablet; Take 1 tablet by mouth daily    3. Impaired glucose tolerance  Assessment & Plan:  Watch carbohydrate intake. Slow but steady weight loss recommended. Try to remain as active as possible. Orders:  -     CBC and differential; Future  -     Comprehensive metabolic panel; Future  -     Hemoglobin A1C; Future    4. Mixed hyperlipidemia  Assessment & Plan:  Watch diet more closely. Slip given for laboratory testing. Increase fiber in diet. Orders:  -     CBC and differential; Future  -     Comprehensive metabolic panel; Future  -     Lipid panel; Future  -     TSH, 3rd generation; Future    5. Morbid obesity ([address])  Assessment & Plan:  Methods for weight loss discussed. Portion control discussed. Increase activity level. Orders:  -     CBC and differential; Future    6. Chronic midline low back pain without sciatica  Assessment & Plan:  Some chronic back issues but stable. Watch for any worsening. Orders:  -     CBC and differential; Future    7. Cholesteatoma of both ears  Assessment & Plan:  Discussed with him that the cholesteatoma was in the ears may be affecting his hearing somewhat. Discussed use of rubbing alcohol or something similar to prevent water being retained in the ear canals. Orders:  -     CBC and differential; Future    8.  Umbilical hernia without obstruction and without gangrene  Assessment & Plan:  Discussed referral to surgeon. Hernia  Discussed with the patient if a hernia would cause pain, he could lay down flat and apply something cool on top of it. Advised him to push it back gradually. Discussed referral to surgeon when he is ready. Warning signs and symptoms discussed. Wellness  Provided a new slip for blood work. Discussed 8 to 12 hours fasting for his blood work and advised fasting methods to get optimum blood work results. Discussed having his colon cancer screening with [facility]. Declines immunizations. We will have him follow-up in about 6 months or sooner if needed depending on upcoming laboratory testing. Depression Screening and Follow-up Plan: Patient was screened for depression during today's encounter. They screened negative with a PHQ-2 score of 0. Subjective:      Patient ID: Seble Caban is a 56 y.o. male who presents for routine follow-up. The patient has improved blood pressure. The patient has an umbilical hernia which sometimes would bother him. He denies any issues with his medications. He denies any changes in his appetite. The patient is prediabetic. The patient has back pain and usually would have occasional flare-ups. The patient notices draining in his left ear. Notices some decreased hearing at times. Denies any chest pain or palpitations. Denies any headaches or localized weakness. Is frustrated by his weight and has been trying to watch his diet somewhat. The following portions of the patient's history were reviewed and updated as appropriate: He  has a past medical history of Hypertension.   He   Patient Active Problem List    Diagnosis Date Noted   • Umbilical hernia without obstruction and without gangrene 11/03/2023   • Cholesteatoma 11/02/2023   • Impaired glucose tolerance 05/03/2023   • Chronic low back pain 04/03/2017   • Hyperlipidemia 09/13/2016   • Morbid obesity ([address]) 03/21/2016   • Seborrheic dermatitis 03/21/2016   • Hypertension 04/28/2014     He  has no past surgical history on file. His family history is not on file. He  reports that he has never smoked. He has never used smokeless tobacco. No history on file for alcohol use and drug use. Current Outpatient Medications   Medication Sig Dispense Refill   • amLODIPine (NORVASC) 2.5 mg tablet Take 3 tablets (7.5 mg total) by mouth daily 270 tablet 1   • lisinopril-hydrochlorothiazide (PRINZIDE,ZESTORETIC) 20-25 MG per tablet Take 1 tablet by mouth daily 90 tablet 1     No current facility-administered medications for this visit. No current outpatient medications on file prior to visit. No current facility-administered medications on file prior to visit. He is allergic to ascorbate - food allergy and fruit extracts. .    Review of Systems  Constitutional: Negative for activity change, appetite change, chills and fever. HENT: Negative for hearing loss. Eyes: Negative for pain and visual disturbance. Respiratory: Negative for chest tightness and shortness of breath. Cardiovascular: Negative for chest pain and palpitations. Gastrointestinal: Negative for abdominal pain, blood in stool, diarrhea, nausea and vomiting. Endocrine: Negative for polydipsia, polyphagia and polyuria. Genitourinary: Negative for dysuria. Musculoskeletal: Negative for arthralgias and gait problem. Skin: Negative for color change. Neurological: Negative for dizziness and headaches. Hematological: Does not bruise/bleed easily. Psychiatric/Behavioral: Negative for behavioral problems. The patient is not nervous/anxious. Objective:  /86 (BP Location: Left arm, Patient Position: Sitting, Cuff Size: Large)   Pulse 73   Ht 5' 10" (1.778 m)   Wt (!) 159 kg (350 lb)   SpO2 98%   BMI 50.22 kg/m²          Physical Exam  Vitals and nursing note reviewed.    Constitutional:       General: He is not in acute distress. Appearance: He is well-developed and well-groomed. He is morbidly obese. HENT:      Head: Normocephalic and atraumatic. Comments: Cholesteatomas bilateral ear canals     Nose: Nose normal.   Eyes:      Conjunctiva/sclera: Conjunctivae normal.      Pupils: Pupils are equal, round, and reactive to light. Cardiovascular:      Rate and Rhythm: Normal rate and regular rhythm. Heart sounds: Normal heart sounds. No murmur heard. No friction rub. No gallop. Pulmonary:      Breath sounds: No wheezing or rales. Chest:      Chest wall: No tenderness. Abdominal:      General: There is no distension. Tenderness: There is no abdominal tenderness. There is no guarding or rebound. Lymphadenopathy:      Cervical: No cervical adenopathy. Skin:     General: Skin is warm and dry. Neurological:      Mental Status: He is alert and oriented to person, place, and time. Psychiatric:         Mood and Affect: Mood and affect normal.         Speech: Speech normal.         Behavior: Behavior normal. Behavior is cooperative. Cognition and Memory: Cognition and memory normal.       Below is the patient's most recent value for Albumin, ALT, AST, BUN, Calcium, Chloride, Cholesterol, CO2, Creatinine, GFR, Glucose, HDL, Hematocrit, Hemoglobin, Hemoglobin A1C, LDL, Magnesium, Phosphorus, Platelets, Potassium, PSA, Sodium, Triglycerides, and WBC. Lab Results   Component Value Date    ALT 25 05/02/2023    AST 20 05/02/2023    BUN 16 05/02/2023    CALCIUM 10.0 05/02/2023    CL 99 05/02/2023    CHOL 214 05/13/2014    CO2 30 05/02/2023    CREATININE 0.83 05/02/2023    HDL 44 05/02/2023    HCT 48.1 05/02/2023    HGB 15.7 05/02/2023    HGBA1C 5.3 09/12/2016     05/02/2023    K 4.3 05/02/2023     05/13/2014    TRIG 168 (H) 05/02/2023    WBC 6.76 05/02/2023     Note: for a comprehensive list of the patient's lab results, access the Results Review activity.     I have personally reviewed results with the patient. His blood work done in 05/2023 has significantly improved. His cholesterol level was 194 mg/dL in 05/2023. His triglyceride level was 168 mg/dL in 05/2023. His blood glucose level is 110 mg/dL in 05/2023. His LDL is 116 mg/dL in 05/2023. Transcribed for Junito Vasquez MD, by Nedra Cobb on 11/03/23 at 4:19 AM. Powered by [employer].

## 2023-11-03 PROBLEM — K42.9 UMBILICAL HERNIA WITHOUT OBSTRUCTION AND WITHOUT GANGRENE: Status: ACTIVE | Noted: 2023-11-03

## 2023-11-03 NOTE — ASSESSMENT & PLAN NOTE
Blood pressure relatively well controlled. Follow blood pressure at home if possible. Watch salt intake. Continue with the amlodipine and the lisinopril/hydrochlorothiazide. Slow but steady weight loss recommended.

## 2023-11-03 NOTE — ASSESSMENT & PLAN NOTE
Discussed with him that the cholesteatoma was in the ears may be affecting his hearing somewhat. Discussed use of rubbing alcohol or something similar to prevent water being retained in the ear canals.

## 2023-11-03 NOTE — ASSESSMENT & PLAN NOTE
Watch carbohydrate intake. Slow but steady weight loss recommended. Try to remain as active as possible.

## 2024-04-30 DIAGNOSIS — I10 ESSENTIAL HYPERTENSION: ICD-10-CM

## 2024-04-30 DIAGNOSIS — I10 PRIMARY HYPERTENSION: ICD-10-CM

## 2024-05-01 RX ORDER — LISINOPRIL AND HYDROCHLOROTHIAZIDE 25; 20 MG/1; MG/1
1 TABLET ORAL DAILY
Qty: 30 TABLET | Refills: 0 | Status: SHIPPED | OUTPATIENT
Start: 2024-05-01

## 2024-05-01 RX ORDER — AMLODIPINE BESYLATE 2.5 MG/1
7.5 TABLET ORAL DAILY
Qty: 90 TABLET | Refills: 0 | Status: SHIPPED | OUTPATIENT
Start: 2024-05-01

## 2024-09-26 DIAGNOSIS — I10 PRIMARY HYPERTENSION: ICD-10-CM

## 2024-09-26 DIAGNOSIS — I10 ESSENTIAL HYPERTENSION: ICD-10-CM

## 2024-09-26 RX ORDER — AMLODIPINE BESYLATE 2.5 MG/1
7.5 TABLET ORAL DAILY
Qty: 90 TABLET | Refills: 0 | Status: SHIPPED | OUTPATIENT
Start: 2024-09-26

## 2024-09-26 RX ORDER — LISINOPRIL AND HYDROCHLOROTHIAZIDE 20; 25 MG/1; MG/1
1 TABLET ORAL DAILY
Qty: 30 TABLET | Refills: 0 | Status: SHIPPED | OUTPATIENT
Start: 2024-09-26

## 2024-09-26 NOTE — TELEPHONE ENCOUNTER
Requested Prescriptions     Pending Prescriptions Disp Refills    lisinopril-hydrochlorothiazide (PRINZIDE,ZESTORETIC) 20-25 MG per tablet 30 tablet 0     Sig: Take 1 tablet by mouth daily    amLODIPine (NORVASC) 2.5 mg tablet 90 tablet 0     Sig: Take 3 tablets (7.5 mg total) by mouth daily

## 2024-09-30 ENCOUNTER — HOSPITAL ENCOUNTER (EMERGENCY)
Facility: HOSPITAL | Age: 49
Discharge: HOME | End: 2024-09-30
Payer: COMMERCIAL

## 2024-09-30 VITALS
SYSTOLIC BLOOD PRESSURE: 171 MMHG | RESPIRATION RATE: 18 BRPM | TEMPERATURE: 97.9 F | OXYGEN SATURATION: 95 % | HEART RATE: 95 BPM | DIASTOLIC BLOOD PRESSURE: 94 MMHG

## 2024-09-30 DIAGNOSIS — S76.019A STRAIN OF HIP FLEXOR: Primary | ICD-10-CM

## 2024-09-30 PROCEDURE — 99283 EMERGENCY DEPT VISIT LOW MDM: CPT

## 2024-09-30 NOTE — Clinical Note
Grey Miller was seen and treated in our emergency department on 9/30/2024.                Diagnosis:     Grey  .    He may return on this date: 10/02/2024         If you have any questions or concerns, please don't hesitate to call.      Manolo Brunner MD    ______________________________           _______________          _______________  Hospital Representative                              Date                                Time

## 2024-10-01 NOTE — ED PROVIDER NOTES
Final diagnoses:   Strain of hip flexor     ED Disposition       ED Disposition   Discharge    Condition   Stable    Date/Time   Mon Sep 30, 2024 11:21 PM    Comment   Grey Johnsk discharge to home/self care.                   Assessment & Plan       Medical Decision Making  Medical Complexity: This 48 year old male is presenting with pain in his right lower glute and into his right medial thigh that is worst when he flexes at the hip.  Patient was mostly concerned about a hamstring injury although he is not really tender in the hamstring area.  He has no bruising along the upper thigh at this time.  He is ambulatory near his baseline though he is having some pain in this area whenever he walks.  He does not have any lower back pain or midline lower back pain.  No numbness or tingling in the leg.  Distal pulses and senses are intact.  Given patient's examination and symptoms are worse with hip flexion, suspect hip flexor strain.  Will advise patient use multimodal analgesia at home with Tylenol and Motrin as well as rest.  I did advise him not to become immobile over the next few days but to try and mobilize his joints as tolerated.  However as he begins to have pain he should limit exertional activities with those types of movements.  In the meantime, patient will follow-up with orthopedic surgery in case he were to have ongoing symptoms.  He may eventually benefit from MRI if he has worsening symptoms or if he has persistent symptoms for more than the next several days especially if it is inhibiting his walking.  No indication at this time for further invasive medical workup or radiographic imaging.     Disposition: Therefore patient was discharged with orthopedic surgery referral and follow-up information provided.  He walked out of the emergency department without much difficulty and with minimal pain.             Medications - No data to display    ED Risk Strat Scores                           SBIRT 22yo+       Flowsheet Row Most Recent Value   Initial Alcohol Screen: US AUDIT-C     1. How often do you have a drink containing alcohol? 0 Filed at: 09/30/2024 2309   2. How many drinks containing alcohol do you have on a typical day you are drinking?  0 Filed at: 09/30/2024 2309   3a. Male UNDER 65: How often do you have five or more drinks on one occasion? 0 Filed at: 09/30/2024 2309   3b. FEMALE Any Age, or MALE 65+: How often do you have 4 or more drinks on one occassion? 0 Filed at: 09/30/2024 2309   Audit-C Score 0 Filed at: 09/30/2024 2309   SHAJI: How many times in the past year have you...    Used an illegal drug or used a prescription medication for non-medical reasons? Never Filed at: 09/30/2024 2309                            History of Present Illness       Chief Complaint   Patient presents with    Leg Pain     Patient states he believes he pulled his right hamstring after lifting his neighbor up. Pain 5/10 on arrival. Was able to ambulate to ED room with steady gait       Past Medical History:   Diagnosis Date    Hypertension       History reviewed. No pertinent surgical history.   History reviewed. No pertinent family history.   Social History     Tobacco Use    Smoking status: Never    Smokeless tobacco: Never   Vaping Use    Vaping status: Never Used      E-Cigarette/Vaping    E-Cigarette Use Never User       E-Cigarette/Vaping Substances      I have reviewed and agree with the history as documented.     This is a 48-year-old male who is presenting today with concern for injury to his right buttock/upper leg.  Patient reports that he was helping a neighbor get out of a bathtub and was flexed at the hip when he felt a sudden pop in his lower right gluteal area.  He notes that since that time several hours ago he has been experiencing significant pain whenever he tries to flex at the hip and he localizes this pain both to the lower right glutes as well as radiating around his medial right upper thigh.  He  denies any sensory loss of the right lower extremity.  He notes that he is still able to walk and bear weight but is having significant pain with hip flexion and internal rotation at the hip.  He denies any snaps or clicks or pops.  He is denying any lower back pain.        Review of Systems   Constitutional:  Negative for chills, fatigue and fever.   HENT:  Negative for congestion and sore throat.    Eyes:  Negative for pain and visual disturbance.   Respiratory:  Negative for cough, chest tightness and shortness of breath.    Cardiovascular:  Negative for chest pain and palpitations.   Gastrointestinal:  Negative for abdominal pain, blood in stool, constipation, diarrhea, nausea and vomiting.   Genitourinary:  Negative for dysuria, flank pain and hematuria.   Musculoskeletal:  Positive for gait problem. Negative for arthralgias, back pain, myalgias and neck pain.   Skin:  Negative for color change and rash.   Neurological:  Negative for dizziness, syncope and light-headedness.   Hematological:  Negative for adenopathy. Does not bruise/bleed easily.   All other systems reviewed and are negative.          Objective       ED Triage Vitals [09/30/24 2308]   Temperature Pulse Blood Pressure Respirations SpO2 Patient Position - Orthostatic VS   97.9 °F (36.6 °C) 95 (!) 171/94 18 95 % --      Temp Source Heart Rate Source BP Location FiO2 (%) Pain Score    Temporal Monitor -- -- 5      Vitals      Date and Time Temp Pulse SpO2 Resp BP Pain Score FACES Pain Rating User   09/30/24 2308 97.9 °F (36.6 °C) 95 95 % 18 171/94 5 -- CK            Physical Exam  Vitals and nursing note reviewed.   Constitutional:       General: He is not in acute distress.     Appearance: He is well-developed. He is obese. He is not toxic-appearing or diaphoretic.   HENT:      Head: Normocephalic and atraumatic.      Right Ear: External ear normal.      Left Ear: External ear normal.      Nose: Nose normal. No congestion or rhinorrhea.       Mouth/Throat:      Mouth: Mucous membranes are moist.      Pharynx: No oropharyngeal exudate or posterior oropharyngeal erythema.   Eyes:      General: No scleral icterus.     Extraocular Movements: Extraocular movements intact.      Conjunctiva/sclera: Conjunctivae normal.      Pupils: Pupils are equal, round, and reactive to light.   Cardiovascular:      Rate and Rhythm: Normal rate and regular rhythm.      Pulses: Normal pulses.      Heart sounds: No murmur heard.  Pulmonary:      Effort: Pulmonary effort is normal. No respiratory distress.      Breath sounds: Normal breath sounds. No wheezing or rales.   Abdominal:      Palpations: Abdomen is soft. There is no mass.      Tenderness: There is no abdominal tenderness. There is no right CVA tenderness, left CVA tenderness or guarding.      Hernia: No hernia is present.   Musculoskeletal:         General: No swelling. Normal range of motion.      Cervical back: Normal range of motion and neck supple.      Right lower leg: No edema.      Left lower leg: No edema.        Legs:       Comments: Patient has tenderness to palpation along the lower right gluteal area.  He states his symptoms are worsened whenever he flexes at the hip and the knee.  His symptoms are also worsened when I passively internally rotate at the hip.  No numbness in the lower extremity.  Palpable DP pulse. No leg swellling.  No bruising.    Skin:     General: Skin is warm and dry.      Capillary Refill: Capillary refill takes less than 2 seconds.   Neurological:      General: No focal deficit present.      Mental Status: He is alert and oriented to person, place, and time.   Psychiatric:         Mood and Affect: Mood normal.         Behavior: Behavior normal.         Results Reviewed       None            No orders to display       Procedures    ED Medication and Procedure Management   Prior to Admission Medications   Prescriptions Last Dose Informant Patient Reported? Taking?   amLODIPine  (NORVASC) 2.5 mg tablet   No No   Sig: Take 3 tablets (7.5 mg total) by mouth daily   lisinopril-hydrochlorothiazide (PRINZIDE,ZESTORETIC) 20-25 MG per tablet   No No   Sig: Take 1 tablet by mouth daily      Facility-Administered Medications: None     Discharge Medication List as of 9/30/2024 11:25 PM        CONTINUE these medications which have NOT CHANGED    Details   amLODIPine (NORVASC) 2.5 mg tablet Take 3 tablets (7.5 mg total) by mouth daily, Starting Thu 9/26/2024, Normal      lisinopril-hydrochlorothiazide (PRINZIDE,ZESTORETIC) 20-25 MG per tablet Take 1 tablet by mouth daily, Starting Thu 9/26/2024, Normal             ED SEPSIS DOCUMENTATION   Time reflects when diagnosis was documented in both MDM as applicable and the Disposition within this note       Time User Action Codes Description Comment    9/30/2024 11:22 PM Manolo Brunner Add [S76.019A] Strain of hip flexor                  Manolo Brunner MD  10/01/24 0423

## 2024-10-01 NOTE — DISCHARGE INSTRUCTIONS
If you are having severe pain with any leg motion, please limit the motion as further stress could cause a rupture of a tendon.  Follow-up with orthopedic surgery at earliest available appointment.  You may eventually need an MRI if you are having ongoing symptoms.

## 2024-10-02 ENCOUNTER — OFFICE VISIT (OUTPATIENT)
Dept: OBGYN CLINIC | Facility: CLINIC | Age: 49
End: 2024-10-02
Payer: COMMERCIAL

## 2024-10-02 ENCOUNTER — APPOINTMENT (OUTPATIENT)
Dept: RADIOLOGY | Facility: MEDICAL CENTER | Age: 49
End: 2024-10-02
Payer: COMMERCIAL

## 2024-10-02 VITALS
HEART RATE: 93 BPM | SYSTOLIC BLOOD PRESSURE: 125 MMHG | HEIGHT: 70 IN | WEIGHT: 315 LBS | OXYGEN SATURATION: 98 % | DIASTOLIC BLOOD PRESSURE: 86 MMHG | RESPIRATION RATE: 16 BRPM | TEMPERATURE: 97.9 F | BODY MASS INDEX: 45.1 KG/M2

## 2024-10-02 DIAGNOSIS — M25.551 RIGHT HIP PAIN: ICD-10-CM

## 2024-10-02 DIAGNOSIS — S76.311A STRAIN OF RIGHT HAMSTRING, INITIAL ENCOUNTER: Primary | ICD-10-CM

## 2024-10-02 PROCEDURE — 73502 X-RAY EXAM HIP UNI 2-3 VIEWS: CPT

## 2024-10-02 PROCEDURE — 99203 OFFICE O/P NEW LOW 30 MIN: CPT | Performed by: STUDENT IN AN ORGANIZED HEALTH CARE EDUCATION/TRAINING PROGRAM

## 2024-10-02 NOTE — PROGRESS NOTES
"Ambulatory Visit  Name: Grey Miller      : 1975      MRN: [id number]  Encounter Provider: Serg De Leon MD  Encounter Date: 10/2/2024   Encounter department: [facility] [place]    Assessment & Plan  Strain of right hamstring, initial encounter  Grey is a 48-year-old male with right posterior hip pain consistent with right proximal hamstring strain versus small partial tear.  We discussed his diagnosis expected outcome.  Pain is already significantly better this can be treated without surgery.  I recommend activity modification and anti-inflammatory medication during the acute period.  I also recommend physical therapy to help on core and lower body strengthening and gait training.  He has been provided with a PT prescription.  He will follow-up with me as needed.  Orders:    Ambulatory Referral to Orthopedic Surgery    Ambulatory Referral to Physical Therapy; Future      History of Present Illness     Grey Miller is a 48 y.o. male who presents with right hip pain.  The pain started last Friday when he was helping a neighbor lift something heavy.  He did feel a pop and had pain in his posterior hip.  He presented to the ER where he was diagnosed with a strain.  Since the injury the pain has been improving as is his ability to ambulate.  He locates the pain to the posterior upper thigh.  And feels sometimes there is a small marblelike object that he is sitting on.  He denies any prior injury to this leg.  Denies numbness or tingling in the leg.    History obtained from : patient  Review of Systems  Medical History Reviewed by provider this encounter:  Tobacco  Allergies  Meds  Problems  Med Hx  Surg Hx  Fam Hx           Objective     /86   Pulse 93   Temp 97.9 °F (36.6 °C) (Temporal)   Resp 16   Ht 5' 10\" (1.778 m)   Wt (!) 157 kg (347 lb 3.2 oz)   SpO2 98%   BMI 49.82 kg/m²     Physical Exam  Hip/Spine Exam  Side: right   Gait: Normal "   Tenderness: Ischium     Skin is intact no ecchymosis or bruising on exam.    Hip range of motion   Flexion Extension IR ER   Left 100 5 5 25   Right 100 5 5 25     5/5 Prone knee flexion on the right with pain    Distally the patient's neurovascular status is normal.    IMAGING:  I reviewed and interpreted multiple x-rays of the right hip taken today, including AP pelvis and AP lateral of the right hip which demonstrate no significant degenerative changes of the hip joint, chronic changes at the ischium's bilaterally.        Administrative Statements   Topics discussed with the patient / family include symptom assessment and management, medication review, and anticipatory guidance.

## 2024-10-02 NOTE — ASSESSMENT & PLAN NOTE
Grey is a 48-year-old male with right posterior hip pain consistent with right proximal hamstring strain versus small partial tear.  We discussed his diagnosis expected outcome.  Pain is already significantly better this can be treated without surgery.  I recommend activity modification and anti-inflammatory medication during the acute period.  I also recommend physical therapy to help on core and lower body strengthening and gait training.  He has been provided with a PT prescription.  He will follow-up with me as needed.  Orders:    Ambulatory Referral to Orthopedic Surgery    Ambulatory Referral to Physical Therapy; Future

## 2024-10-10 ENCOUNTER — EVALUATION (OUTPATIENT)
Dept: PHYSICAL THERAPY | Facility: HOME HEALTHCARE | Age: 49
End: 2024-10-10
Payer: COMMERCIAL

## 2024-10-10 DIAGNOSIS — S76.311A STRAIN OF RIGHT HAMSTRING MUSCLE, INITIAL ENCOUNTER: Primary | ICD-10-CM

## 2024-10-10 DIAGNOSIS — S76.311A STRAIN OF RIGHT HAMSTRING, INITIAL ENCOUNTER: ICD-10-CM

## 2024-10-10 PROCEDURE — 97110 THERAPEUTIC EXERCISES: CPT | Performed by: PHYSICAL THERAPIST

## 2024-10-10 PROCEDURE — 97161 PT EVAL LOW COMPLEX 20 MIN: CPT | Performed by: PHYSICAL THERAPIST

## 2024-10-10 NOTE — PROGRESS NOTES
PT Evaluation     Today's date: 10/10/2024  Patient name: Grey Miller  : 1975  MRN: [id number]  Referring provider: No ref. provider found  Dx:   Encounter Diagnosis     ICD-10-CM    1. Strain of right hamstring muscle, initial encounter  S76.311A           Start Time: 705  Stop Time: 748  Total time in clinic (min): 43 minutes    Assessment  Impairments: abnormal gait, abnormal or restricted ROM, lacks appropriate home exercise program, pain with function, weight-bearing intolerance, participation limitations, activity limitations and endurance  Symptom irritability: moderate    Assessment details: Pt is a 48 y.o. male presenting to OP PT clinic in [place] w/ referral for R Hamstring strain.  Pt presents w/ decreased strength in R HS AROM, decreased AROM in B/L knees, pain w/ ambulation, stair climbing, sitting for >30mins, and pain w/ function.  Pt has difficulty performing ADLs and recreational activities due to above mentioned deficits.  Pt would benefit from skilled therapy to address impairments, allowing pt to perform ADLs and recreational activities w/o restriction or pain to improve QoL and return to OF.  PT gave pt HEP focusing on performing exercises/ activities outside of the clinic to further improve and address impairments.  Thank you for this referral!      Understanding of Dx/Px/POC: good     Prognosis: good    Goals  STG:  -Pt will improve pain from 8/10 to 5/10 at worst to allow reduced difficulty performing ADLs due to pain in 4 weeks.  -Pt will increase R knee flexion AROM from 115* to 120* to allow pt w/ improved ability to ascend/descend stairs w/ less difficulty in 4 weeks.  -Pt will increase R HS strength from 4/5 to 4+/5 to allow pt w/ improved ability of performing a STS tx w/ less difficulty in 4 weeks.    LTG:  -Pt will be d/c from OP PT w/ I HEP to allow pt to continue improving upon their impairments for improved ADLs/ recreational activities in 12 weeks.  -Pt will  "improve sitting tolerance from 30mins to >1hr to allow improved ability to perform ADLs/ recreational activities w/o need for a rest break in 12 weeks.  -Pt will improve 5xSTS time from 16.24s to <13s to show increased gait speed demonstrating ability to walk across street w/ less difficulty in 12 weeks.     Plan  Patient would benefit from: skilled physical therapy  Planned modality interventions: cryotherapy, unattended electrical stimulation and thermotherapy: hydrocollator packs    Planned therapy interventions: home exercise program, therapeutic exercise, therapeutic activities, stretching, strengthening, functional ROM exercises, flexibility, neuromuscular re-education, manual therapy, massage and balance/weight bearing training    Frequency: 2x week  Duration in weeks: 12  Plan of Care beginning date: 10/10/2024  Plan of Care expiration date: 1/2/2025  Treatment plan discussed with: patient and referring physician  Plan details: Begin POC focusing on addressing & improving impairments listed in eval.        Subjective Evaluation    History of Present Illness  Date of onset: 9/27/2024  Mechanism of injury: trauma  Mechanism of injury: Pt states that after feeling pain, he states that his gait had changed due to the pain.  Pain is intermittent radiating from hip to knee.  Pt states that he is able to ambulate his distances w/o needing a break, but pain is there and he pushes through.  Once pain begins it stays throughout day, pt states that for first 5-10mins there is no pain in the morning but then it presents.  Pt states that he can stand w/o complications, most pain is when he sits at work.  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  Pt states that he has pain w/ climbing stairs but is not limited to perform stair climbing.  Pt is a S/L sleeper and does not have problems due to HS pain.  Pt states that job duties have been \"lessened\" to tolerance due to pain.  Pt not taking " "meds for pain.      From Ortho Dr. De Leon encounter note from 10/2/24, \"R hip/HS pain started last Friday when he was helping a neighbor lift something heavy.  He did feel a pop and had pain in his posterior hip.  He presented to the ER where he was diagnosed with a strain.  Since the injury the pain has been improving as is his ability to ambulate.  He locates the pain to the posterior upper thigh.  And feels sometimes there is a small marblelike object that he is sitting on.  He denies any prior injury to this leg.  Denies numbness or tingling in the leg.\"  Quality of life: good    Patient Goals  Patient goals for therapy: increased strength, decreased pain, independence with ADLs/IADLs, return to sport/leisure activities, improved balance, increased motion and return to work  Patient goal: Pt wants to improve pain to return to full duty at work w/o complications.  Pain  Current pain ratin  At best pain ratin  At worst pain ratin  Location: R Hip/Hamstring  Quality: sharp and pressure  Relieving factors: change in position  Aggravating factors: sitting, walking, stair climbing and lifting    Social Support  Stairs in house: yes   Lives with: significant other    Employment status: working    Diagnostic Tests  X-ray: normal  Treatments  Current treatment: physical therapy        Objective     Tenderness     Right Hip   Tenderness in the ischial tuberosity.     Active Range of Motion   Left Hip   Normal active range of motion    Right Hip   Normal active range of motion  Left Knee   Flexion: 120 degrees   Extension: WFL    Right Knee   Flexion: 115 degrees   Extension: WFL    Strength/Myotome Testing     Left Hip   Planes of Motion   Flexion: 5  Extension: 5  Abduction: 5  Adduction: 5    Right Hip   Planes of Motion   Flexion: 4  Extension: 4+  Abduction: 5  Adduction: 5    Left Knee   Flexion: 5  Extension: 5    Right Knee   Flexion: 4  Extension: 5    Tests     Left Hip   Arley: Positive.   SLR: " Positive.     Right Hip   Arley: Positive.   SLR: Positive.     Ambulation     Observational Gait   Gait: antalgic   Decreased walking speed, right stance time and right step length.     Functional Assessment        Comments  5xSTS = 16.24s    General Comments:      Hip Comments   PT had pt perform repeated active SLRs (10x) pt stated that pain was not present in R HS when performing despite experiencing slight stretch.    Knee Comments  PT had pt perform repeated HS curls (10x), pt stated that pain did not increase but was present in R HS/Hip             Precautions: n/a      Manuals 10/10         HS, Quad, Gastroc                                           Neuro Re-Ed           Balance as appropriate                                 Ther Ex          NuStep           Ankle pumps           HR/TR        Standing Hip flex/abd/ext        Mini Squats        Step ups/ Step downs                        Hamstring Curls HEP         Bridges HEP                           LAQ           Hip add          Hip abd          SLR          SAQ        S/L SLR                    Self HS Stretch (2 way) HEP         Self Gastroc Stretch  HEP                                                    Ther Activity                                Gait Training                                Modalities          CP                      Access Code: [email or web address]  URL: [email or web address]  Date: 10/10/2024  Prepared by: Clara Claudio    Exercises  - Supine Bridge  - 2-3 x daily - 7 x weekly - 2 sets - 10 reps  - Seated Hamstring Stretch  - 2-3 x daily - 7 x weekly - 3 sets - 3-4 reps - 30sec hold  - Seated Table Hamstring Stretch  - 2-3 x daily - 7 x weekly - 1 sets - 3-4 reps - 30sec hold  - Gastroc Stretch on Wall  - 2-3 x daily - 7 x weekly - 1 sets - 3-4 reps - 30sec hold  - Standing Hamstring Curl with Resistance  - 2-3 x daily - 7 x weekly - 2 sets - 10 reps

## 2024-10-15 ENCOUNTER — OFFICE VISIT (OUTPATIENT)
Dept: PHYSICAL THERAPY | Facility: HOME HEALTHCARE | Age: 49
End: 2024-10-15
Payer: COMMERCIAL

## 2024-10-15 DIAGNOSIS — S76.311A STRAIN OF RIGHT HAMSTRING MUSCLE, INITIAL ENCOUNTER: Primary | ICD-10-CM

## 2024-10-15 PROCEDURE — 97110 THERAPEUTIC EXERCISES: CPT | Performed by: PHYSICAL THERAPIST

## 2024-10-15 NOTE — PROGRESS NOTES
"Daily Note     Today's date: 10/15/2024  Patient name: Grey Miller  : 1975  MRN: [id number]  Referring provider: Serg De Leon,*  Dx:   Encounter Diagnosis     ICD-10-CM    1. Strain of right hamstring muscle, initial encounter  S76.311A           Start Time: 735  Stop Time: 824  Total time in clinic (min): 49 minutes    Subjective: Pt states that his R hamstring is improving, still having pain however limiting ability to perform job duties without taking a rest break due to discomfort.       Objective: See treatment diary below      Assessment: Pt tolerated skilled therapy session focusing on strengthening and stretching R hamstring along w/ adjacent musculature.  Pt was able to perform exercise machines due to strength that was already present, continue using machines for further strengthening.  Pt states that the stretching of his R hamstring helps relieve the pain and soreness caused by the strain.       Plan: Continue per plan of care.      Precautions: n/a      Manuals 10/10 10/15        HS, Quad, Gastroc  DF    HS, Gastroc RLE                                         Neuro Re-Ed           Balance as appropriate                                 Ther Ex          NuStep   L3 10'        Ankle pumps           HR/TR  20x each      Standing Hip flex/abd/ext  15x each b/l      Bridges w/ Ball squeeze  3\" x10      Step ups/ Step downs                LF Hamstring Curls  30# 2x10      Leg Press  110# 2x10  160# 1x10      LF Hip ABD  60# 2x15              Hamstring Curls HEP         Bridges HEP                           LAQ   5\"x10 each b/l        Hip add          Hip abd          Prone SLR  1x10 each b/l        SAQ        S/L SLR  1x10 each b/l                  Self HS Stretch (2 way) HEP 30\"x4 each b/l         Self Gastroc Stretch  HEP                                                    Ther Activity                                Gait Training                                Modalities          CP    "                   Access Code: [email or web address]  URL: [email or web address]  Date: 10/10/2024  Prepared by: Clara Claudio    Exercises  - Supine Bridge  - 2-3 x daily - 7 x weekly - 2 sets - 10 reps  - Seated Hamstring Stretch  - 2-3 x daily - 7 x weekly - 3 sets - 3-4 reps - 30sec hold  - Seated Table Hamstring Stretch  - 2-3 x daily - 7 x weekly - 1 sets - 3-4 reps - 30sec hold  - Gastroc Stretch on Wall  - 2-3 x daily - 7 x weekly - 1 sets - 3-4 reps - 30sec hold  - Standing Hamstring Curl with Resistance  - 2-3 x daily - 7 x weekly - 2 sets - 10 reps

## 2024-10-17 ENCOUNTER — APPOINTMENT (OUTPATIENT)
Dept: PHYSICAL THERAPY | Facility: HOME HEALTHCARE | Age: 49
End: 2024-10-17
Payer: COMMERCIAL

## 2024-10-22 ENCOUNTER — OFFICE VISIT (OUTPATIENT)
Dept: PHYSICAL THERAPY | Facility: HOME HEALTHCARE | Age: 49
End: 2024-10-22
Payer: COMMERCIAL

## 2024-10-22 DIAGNOSIS — S76.311A STRAIN OF RIGHT HAMSTRING MUSCLE, INITIAL ENCOUNTER: Primary | ICD-10-CM

## 2024-10-22 PROCEDURE — 97110 THERAPEUTIC EXERCISES: CPT

## 2024-10-22 NOTE — PROGRESS NOTES
"Daily Note     Today's date: 10/22/2024  Patient name: Grey Miller  : 1975  MRN: [id number]  Referring provider: Serg De Leon,*  Dx: No diagnosis found.    Start Time: 0704          Subjective: Not much pain this morning. Pain of 2-3/10 at R HS.     Objective: See treatment diary below    Assessment:  Pt with good oral to session. Pt reports muscle soreness and fatigue more than pain. Pt was able to advance with reps of TE as per flow sheet without c/o increased s/s. Pt reports consistency with HEP as daily ability allows.  Pt reports relief of tightness at end with MT and self stretch. Patient would benefit from continued PT    Plan: Continue per plan of care.      Precautions: n/a      Manuals 10/10 10/15 10-22       HS, Quad, Gastroc  DF    HS, Gastroc RLE EC  R HS, gastroc                                        Neuro Re-Ed           Balance as appropriate                                 Ther Ex   10-22       NuStep   L3 10' L3  10'       Ankle pumps           HR/TR  20x each x20     Standing Hip flex/abd/ext  15x each b/l X15 ea dani     Bridges w/ Ball squeeze  3\" x10 3\" 2x10     Step ups/ Step downs                LF Hamstring Curls  30# 2x10 30# 2x10     Leg Press  110# 2x10  160# 1x10 110# 1x10  160# 2x20       LF Hip ABD  60# 2x15 60#  2x30x             Hamstring Curls HEP         Bridges HEP                           LAQ   5\"x10 each b/l 5\" 2x10 dani       Hip add          Hip abd          Prone SLR  1x10 each b/l 1x10 ea dani       SAQ        S/L SLR  1x10 each b/l 1x10 ea dani                 Self HS Stretch (2 way) HEP 30\"x4 each b/l  20# x4 ea dani       Self Gastroc Stretch  HEP                                                    Ther Activity                                Gait Training                                Modalities          CP                      Access Code: [email or web address]  URL: [email or web address]  Date: 10/10/2024  Prepared by: Clara Claudio    Exercises  - " Supine Bridge  - 2-3 x daily - 7 x weekly - 2 sets - 10 reps  - Seated Hamstring Stretch  - 2-3 x daily - 7 x weekly - 3 sets - 3-4 reps - 30sec hold  - Seated Table Hamstring Stretch  - 2-3 x daily - 7 x weekly - 1 sets - 3-4 reps - 30sec hold  - Gastroc Stretch on Wall  - 2-3 x daily - 7 x weekly - 1 sets - 3-4 reps - 30sec hold  - Standing Hamstring Curl with Resistance  - 2-3 x daily - 7 x weekly - 2 sets - 10 reps

## 2024-10-25 ENCOUNTER — OFFICE VISIT (OUTPATIENT)
Dept: PHYSICAL THERAPY | Facility: HOME HEALTHCARE | Age: 49
End: 2024-10-25
Payer: COMMERCIAL

## 2024-10-25 DIAGNOSIS — S76.311A STRAIN OF RIGHT HAMSTRING MUSCLE, INITIAL ENCOUNTER: Primary | ICD-10-CM

## 2024-10-25 PROCEDURE — 97140 MANUAL THERAPY 1/> REGIONS: CPT | Performed by: PHYSICAL THERAPIST

## 2024-10-25 PROCEDURE — 97110 THERAPEUTIC EXERCISES: CPT | Performed by: PHYSICAL THERAPIST

## 2024-10-25 NOTE — PROGRESS NOTES
"Daily Note     Today's date: 10/25/2024  Patient name: Grey Miller  : 1975  MRN: [id number]  Referring provider: Serg De Leon,*  Dx:   Encounter Diagnosis     ICD-10-CM    1. 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  Pt states that the pain has evolved from being immense pain to now feeling sore/stiff, still has weakness in R HS when performing HS curls.       Objective: See treatment diary below      Assessment:  Pt tolerated treatment focusing on soft tissue release.  Due to pt's complaints of tightness/ stiffness in R HS, PT wanted to performing stretching and check on possible fibrotic tissue.  PT found multiple TrPs along R HS, PT performed releases that pt stated helped relieve tightness in R HS.  At end of session, PT stated to pt to inform PT how pt feels throughout the day and weekend after TrP release session.  PT also stated to use a golf ball at home for self-TrP release.       Plan: Continue per plan of care.      Precautions: n/a      Manuals 10/10 10/15 10-22  10/25     HS, Quad, Gastroc  DF    HS, Gastroc RLE EC  R HS, gastroc  DF  R HS      [place] Roller     DF      TrP Release     DF - multiple locations along R HS due to fibrotic tissue                Neuro Re-Ed           Balance as appropriate                                 Ther Ex   10-22  10/25     NuStep   L3 10' L3  10'  L3 10'     Ankle pumps           HR/TR  20x each x20     Standing Hip flex/abd/ext  15x each b/l X15 ea dani     Bridges w/ Ball squeeze  3\" x10 3\" 2x10     Step ups/ Step downs                LF Hamstring Curls  30# 2x10 30# 2x10     Leg Press  110# 2x10  160# 1x10 110# " "1x10  160# 2x20       LF Hip ABD  60# 2x15 60#  2x30x             Hamstring Curls HEP         Bridges HEP                           LAQ   5\"x10 each b/l 5\" 2x10 dani       Hip add          Hip abd          Prone SLR  1x10 each b/l 1x10 ea dani       SAQ        S/L SLR  1x10 each b/l 1x10 ea dani                 Self HS Stretch (2 way) HEP 30\"x4 each b/l  20# x4 ea dani       Self Gastroc Stretch  HEP                                                    Ther Activity                                Gait Training                                Modalities          CP                      Access Code: [email or web address]  URL: [email or web address]  Date: 10/10/2024  Prepared by: Clara Claudio    Exercises  - Supine Bridge  - 2-3 x daily - 7 x weekly - 2 sets - 10 reps  - Seated Hamstring Stretch  - 2-3 x daily - 7 x weekly - 3 sets - 3-4 reps - 30sec hold  - Seated Table Hamstring Stretch  - 2-3 x daily - 7 x weekly - 1 sets - 3-4 reps - 30sec hold  - Gastroc Stretch on Wall  - 2-3 x daily - 7 x weekly - 1 sets - 3-4 reps - 30sec hold  - Standing Hamstring Curl with Resistance  - 2-3 x daily - 7 x weekly - 2 sets - 10 reps           "

## 2024-10-30 ENCOUNTER — OFFICE VISIT (OUTPATIENT)
Dept: PHYSICAL THERAPY | Facility: HOME HEALTHCARE | Age: 49
End: 2024-10-30
Payer: COMMERCIAL

## 2024-10-30 DIAGNOSIS — S76.311A STRAIN OF RIGHT HAMSTRING MUSCLE, INITIAL ENCOUNTER: Primary | ICD-10-CM

## 2024-10-30 PROCEDURE — 97140 MANUAL THERAPY 1/> REGIONS: CPT

## 2024-10-30 PROCEDURE — 97110 THERAPEUTIC EXERCISES: CPT

## 2024-10-30 NOTE — PROGRESS NOTES
Ambulatory Visit  Name: Grey Miller      : 1975      MRN: [id number]  Encounter Provider: Beth Oquendo MD  Encounter Date: 10/31/2024   Encounter department: [facility]    Assessment & Plan  Primary hypertension    Orders:    CBC and differential; Future    Comprehensive metabolic panel; Future    amLODIPine (NORVASC) 2.5 mg tablet; Take 3 tablets (7.5 mg total) by mouth daily    Impaired glucose tolerance    Orders:    CBC and differential; Future    Comprehensive metabolic panel; Future    Hemoglobin A1C; Future    Mild intermittent reactive airway disease without complication    Orders:    CBC and differential; Future    Comprehensive metabolic panel; Future    albuterol (Proventil HFA) 90 mcg/act inhaler; Inhale 2 puffs every 6 (six) hours as needed for wheezing    Mixed hyperlipidemia    Orders:    CBC and differential; Future    Comprehensive metabolic panel; Future    Lipid panel; Future    TSH, 3rd generation; Future    Morbid obesity (HCC)      Orders:    CBC and differential; Future    Comprehensive metabolic panel; Future    Hemoglobin A1C; Future    Strain of right hamstring, subsequent encounter    Orders:    CBC and differential; Future    Comprehensive metabolic panel; Future    Essential hypertension    Orders:    CBC and differential; Future    Comprehensive metabolic panel; Future    lisinopril-hydrochlorothiazide (PRINZIDE,ZESTORETIC) 20-25 MG per tablet; Take 1 tablet by mouth daily    Screening for colon cancer    Orders:    Avril    Orders and recommendations as noted above.  Blood pressure significantly elevated in the office but he had just taken his blood pressure medication and blood pressure readings at home have been stable.  Continue with the amlodipine as well as lisinopril/hydrochlorothiazide.  Discussed continuing to follow blood pressures at home and to contact office if these increase.  Continue to follow-up with physical therapy  regarding the hamstring injury.  Discussed that the healing process can be long.  Is overdue for laboratory testing.  Prescription given for this.  Discussed watching diet.  Discussed portion control.  Slow but steady weight loss recommended.  Try to increase fiber in diet.  Intermittent issues with wheezing and cough discussed.  Potential environmental triggers discussed.  Use of over-the-counter antihistamines discussed.  Prescription given for albuterol.  Discussed with him other potential treatments on a daily basis if symptoms persist or worsen.  Try to remain as active as possible.  Stay adequately hydrated.  Will have him follow-up in about 4 to 6 months or sooner if needed.    Depression Screening and Follow-up Plan: Patient was screened for depression during today's encounter. They screened negative with a PHQ-2 score of 0.      History of Present Illness     He presents for routine follow-up.  He has generally been doing relatively well.  He did injure his right hamstring and is undergoing physical therapy for this.  It is slowly improving.  Has been taking his blood pressure medications regularly.  States that he did not yet take these this morning.  Blood pressures at home have been running in the 130s/80s for the most part.  He feels that he does have a component of whitecoat hypertension.  Denies any headaches or localized weakness.  Denies any chest pain or palpitations.  Appetite has been generally stable.  Has been trying to cut back somewhat.  He has noticed some increasing allergy symptoms.  Some increased congestion but has noted some wheezing and coughing at times.  Has noticed this with some exposures including cats and working with some treated woods.  Did have the Cologuard at home but did not completed.  He does feel that this is outdated at this point.        History obtained from : patient  Review of Systems   Constitutional:  Negative for activity change, appetite change, chills and  fever.   HENT:  Positive for congestion. Negative for hearing loss.    Eyes:  Negative for pain and visual disturbance.   Respiratory:  Positive for cough and wheezing. Negative for chest tightness and shortness of breath.    Cardiovascular:  Negative for chest pain and palpitations.   Gastrointestinal:  Negative for abdominal pain, blood in stool, diarrhea, nausea and vomiting.   Endocrine: Negative for polydipsia, polyphagia and polyuria.   Genitourinary:  Negative for dysuria.   Musculoskeletal:  Positive for arthralgias and myalgias. Negative for gait problem.   Skin:  Negative for color change.   Allergic/Immunologic: Positive for environmental allergies.   Neurological:  Negative for dizziness and headaches.   Hematological:  Does not bruise/bleed easily.   Psychiatric/Behavioral:  Negative for behavioral problems. The patient is not nervous/anxious.      Medical History Reviewed by provider this encounter:       Past Medical History   Past Medical History:   Diagnosis Date    Hypertension      No past surgical history on file.  No family history on file.  Current Outpatient Medications on File Prior to Visit   Medication Sig Dispense Refill    [DISCONTINUED] amLODIPine (NORVASC) 2.5 mg tablet Take 3 tablets (7.5 mg total) by mouth daily 90 tablet 0    [DISCONTINUED] lisinopril-hydrochlorothiazide (PRINZIDE,ZESTORETIC) 20-25 MG per tablet Take 1 tablet by mouth daily 30 tablet 0     No current facility-administered medications on file prior to visit.     Allergies   Allergen Reactions    Ascorbate - Food Allergy Anaphylaxis    Fruit Extracts       Current Outpatient Medications on File Prior to Visit   Medication Sig Dispense Refill    [DISCONTINUED] amLODIPine (NORVASC) 2.5 mg tablet Take 3 tablets (7.5 mg total) by mouth daily 90 tablet 0    [DISCONTINUED] lisinopril-hydrochlorothiazide (PRINZIDE,ZESTORETIC) 20-25 MG per tablet Take 1 tablet by mouth daily 30 tablet 0     No current facility-administered  "medications on file prior to visit.      Social History     Tobacco Use    Smoking status: Never    Smokeless tobacco: Never   Vaping Use    Vaping status: Never Used   Substance and Sexual Activity    Alcohol use: Not on file    Drug use: Not on file    Sexual activity: Not on file         Objective     /96 (BP Location: Right arm, Patient Position: Sitting, Cuff Size: Large)   Pulse 95   Temp (!) 97.3 °F (36.3 °C)   Ht 5' 10\" (1.778 m)   Wt (!) 158 kg (348 lb 3.2 oz)   SpO2 92%   BMI 49.96 kg/m²     Physical Exam  Vitals and nursing note reviewed.   Constitutional:       General: He is not in acute distress.     Appearance: He is well-developed and well-groomed. He is morbidly obese.   HENT:      Head: Normocephalic and atraumatic.      Nose: Nose normal.   Eyes:      Conjunctiva/sclera: Conjunctivae normal.      Pupils: Pupils are equal, round, and reactive to light.   Cardiovascular:      Rate and Rhythm: Normal rate and regular rhythm.      Heart sounds: Normal heart sounds. No murmur heard.     No friction rub. No gallop.   Pulmonary:      Breath sounds: Decreased breath sounds present. No wheezing or rales.   Chest:      Chest wall: No tenderness.   Abdominal:      General: There is no distension.      Tenderness: There is no abdominal tenderness. There is no guarding or rebound.   Lymphadenopathy:      Cervical: No cervical adenopathy.   Skin:     General: Skin is warm and dry.   Neurological:      Mental Status: He is alert and oriented to person, place, and time.   Psychiatric:         Mood and Affect: Mood and affect normal.         Speech: Speech normal.         Behavior: Behavior normal. Behavior is cooperative.         Cognition and Memory: Cognition and memory normal.         "

## 2024-10-30 NOTE — PROGRESS NOTES
"Daily Note     Today's date: 10/30/2024  Patient name: Grey Miller  : 1975  MRN: [id number]  Referring provider: Serg De Leon,*  Dx: No diagnosis found.    Start Time: 708          Subjective: A little pain and stiffness this morning. 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 I ast night and that position really bothered my leg. was cutting wood using a table saw     Objective: See treatment diary below    Assessment: Pt with good oral to session and without c/o increased pain t/o ex. Pt with HS tightness remaining and reports consistency with HEP and self stretch. Pt with tight nodules noted along HS during foam roller and deep pressure release. Pt reports reduced tightness at end of tx. Patient demonstrated fatigue post treatment and would benefit from continued PT    Plan: Continue per plan of care.      Precautions: n/a      Manuals 10/10 10/15 10-22  10/25  10-30   HS, Quad, Gastroc  DF    HS, Gastroc RLE EC  R HS, gastroc  DF  R HS  EC  R HS/SL Quad    [place] Roller     DF  EC    TrP Release     DF - multiple locations along R HS due to fibrotic tissue  EC  R HS              Neuro Re-Ed           Balance as appropriate                                 Ther Ex   10-22  10/25  10-30   NuStep   L3 10' L3  10'  L3 10'  L3  8'   Ankle pumps           HR/TR  20x each x20     Standing Hip flex/abd/ext  15x each b/l X15 ea dani     Bridges w/ Ball squeeze  3\" x10 3\" 2x10     Step ups/ Step downs                LF Hamstring Curls  30# 2x10 30# 2x10  30# 2x10   Leg Press  110# 2x10  160# 1x10 110# 1x10  160# 2x20    110# x10  160# 2x20   LF Hip ABD  60# 2x15 60#  2x30x  60# 2x30           Hamstring Curls HEP         Bridges HEP                           LAQ   5\"x10 each b/l 5\" 2x10 dani    30# 2x10   Hip add          Hip abd          Prone SLR  1x10 each b/l 1x10 ea dani    1x10 ea dani   SAQ        S/L SLR  1x10 each b/l 1x10 ea dani    1x10 ea dani             Self HS Stretch (2 way) HEP 30\"x4 each b/l  " "20\" x4 ea dani    20\" x4 ea dani   Self Gastroc Stretch  HEP                                                    Ther Activity                                Gait Training                                Modalities          CP                      Access Code: [email or web address]  URL: [email or web address]  Date: 10/10/2024  Prepared by: Clara Claudio    Exercises  - Supine Bridge  - 2-3 x daily - 7 x weekly - 2 sets - 10 reps  - Seated Hamstring Stretch  - 2-3 x daily - 7 x weekly - 3 sets - 3-4 reps - 30sec hold  - Seated Table Hamstring Stretch  - 2-3 x daily - 7 x weekly - 1 sets - 3-4 reps - 30sec hold  - Gastroc Stretch on Wall  - 2-3 x daily - 7 x weekly - 1 sets - 3-4 reps - 30sec hold  - Standing Hamstring Curl with Resistance  - 2-3 x daily - 7 x weekly - 2 sets - 10 reps             "

## 2024-10-31 ENCOUNTER — OFFICE VISIT (OUTPATIENT)
Dept: INTERNAL MEDICINE CLINIC | Facility: CLINIC | Age: 49
End: 2024-10-31
Payer: COMMERCIAL

## 2024-10-31 VITALS
SYSTOLIC BLOOD PRESSURE: 168 MMHG | HEIGHT: 70 IN | OXYGEN SATURATION: 92 % | TEMPERATURE: 97.3 F | DIASTOLIC BLOOD PRESSURE: 96 MMHG | HEART RATE: 95 BPM | BODY MASS INDEX: 45.1 KG/M2 | WEIGHT: 315 LBS

## 2024-10-31 DIAGNOSIS — I10 PRIMARY HYPERTENSION: Primary | ICD-10-CM

## 2024-10-31 DIAGNOSIS — I10 ESSENTIAL HYPERTENSION: ICD-10-CM

## 2024-10-31 DIAGNOSIS — E66.01 MORBID OBESITY (HCC): ICD-10-CM

## 2024-10-31 DIAGNOSIS — Z12.11 SCREENING FOR COLON CANCER: ICD-10-CM

## 2024-10-31 DIAGNOSIS — E78.2 MIXED HYPERLIPIDEMIA: ICD-10-CM

## 2024-10-31 DIAGNOSIS — J45.20 MILD INTERMITTENT REACTIVE AIRWAY DISEASE WITHOUT COMPLICATION: ICD-10-CM

## 2024-10-31 DIAGNOSIS — S76.311D STRAIN OF RIGHT HAMSTRING, SUBSEQUENT ENCOUNTER: ICD-10-CM

## 2024-10-31 DIAGNOSIS — R73.02 IMPAIRED GLUCOSE TOLERANCE: ICD-10-CM

## 2024-10-31 PROBLEM — J45.909 REACTIVE AIRWAY DISEASE WITHOUT COMPLICATION: Status: ACTIVE | Noted: 2024-10-31

## 2024-10-31 PROCEDURE — 99214 OFFICE O/P EST MOD 30 MIN: CPT | Performed by: FAMILY MEDICINE

## 2024-10-31 RX ORDER — LISINOPRIL AND HYDROCHLOROTHIAZIDE 20; 25 MG/1; MG/1
1 TABLET ORAL DAILY
Qty: 30 TABLET | Refills: 0 | Status: SHIPPED | OUTPATIENT
Start: 2024-10-31

## 2024-10-31 RX ORDER — ALBUTEROL SULFATE 90 UG/1
2 INHALANT RESPIRATORY (INHALATION) EVERY 6 HOURS PRN
Qty: 20.1 G | Refills: 3 | Status: SHIPPED | OUTPATIENT
Start: 2024-10-31

## 2024-10-31 RX ORDER — AMLODIPINE BESYLATE 2.5 MG/1
7.5 TABLET ORAL DAILY
Qty: 90 TABLET | Refills: 0 | Status: SHIPPED | OUTPATIENT
Start: 2024-10-31

## 2024-11-01 ENCOUNTER — OFFICE VISIT (OUTPATIENT)
Dept: PHYSICAL THERAPY | Facility: HOME HEALTHCARE | Age: 49
End: 2024-11-01
Payer: COMMERCIAL

## 2024-11-01 DIAGNOSIS — S76.311A STRAIN OF RIGHT HAMSTRING MUSCLE, INITIAL ENCOUNTER: Primary | ICD-10-CM

## 2024-11-01 PROCEDURE — 97110 THERAPEUTIC EXERCISES: CPT | Performed by: PHYSICAL THERAPIST

## 2024-11-01 PROCEDURE — 97140 MANUAL THERAPY 1/> REGIONS: CPT | Performed by: PHYSICAL THERAPIST

## 2024-11-01 NOTE — ASSESSMENT & PLAN NOTE
Orders:    CBC and differential; Future    Comprehensive metabolic panel; Future    albuterol (Proventil HFA) 90 mcg/act inhaler; Inhale 2 puffs every 6 (six) hours as needed for wheezing

## 2024-11-01 NOTE — ASSESSMENT & PLAN NOTE
Orders:    CBC and differential; Future    Comprehensive metabolic panel; Future    Hemoglobin A1C; Future

## 2024-11-01 NOTE — ASSESSMENT & PLAN NOTE
Orders:    CBC and differential; Future    Comprehensive metabolic panel; Future    amLODIPine (NORVASC) 2.5 mg tablet; Take 3 tablets (7.5 mg total) by mouth daily

## 2024-11-01 NOTE — PROGRESS NOTES
"Daily Note     Today's date: 2024  Patient name: Grey Miller  : 1975  MRN: [id number]  Referring provider: Serg De Leon,*  Dx:   Encounter Diagnosis     ICD-10-CM    1. Strain of right hamstring muscle, initial encounter  S76.311A           Start Time: 0702          Subjective: Pt states that his R HS is progressing, having less tightness and pain overall.  Still has the most discomfort when he sits for longer periods of time.  Pt used a golf ball to help w/ trigger points at home, stated that it helped.       Objective: See treatment diary below      Assessment:  Pt tolerated treatment session w/o increase in R HS pain.  Pt was able to perform increased reps of TE to further strengthen R HS.  Pt still presenting and getting relief w/ TrP release and manual stretching.       Plan: Continue per plan of care.      Precautions: n/a      Manuals 11/1 10/15 10-22  10/25  10-30   HS, Quad, Gastroc  DF    HS, Gastroc RLE EC  R HS, gastroc  DF  R HS  EC  R HS/SL Quad    T[place] Roller DF    DF  EC    TrP Release DF    DF - multiple locations along R HS due to fibrotic tissue  EC  R HS              Neuro Re-Ed           Balance as appropriate                                 Ther Ex   10-22  10/25  10-30   NuStep  L3 10' L3 10' L3  10'  L3 10'  L3  8'   Ankle pumps           HR/TR  20x each x20     Standing Hip flex/abd/ext  15x each b/l X15 ea dani     Bridges w/ Ball squeeze  3\" x10 3\" 2x10     Step ups/ Step downs                LF Hamstring Curls 35# 2x15 30# 2x10 30# 2x10  30# 2x10   Leg Press 200# 2x15 110# 2x10  160# 1x10 110# 1x10  160# 2x20    110# x10  160# 2x20   LF Hip ABD 60# 3x20 60# 2x15 60#  2x30x  60# 2x30           Hamstring Curls          Bridges                            LAQ  On LF  35# 2x15 5\"x10 each b/l 5\" 2x10 dani    30# 2x10   Hip add          Hip abd          Prone SLR  1x10 each b/l 1x10 ea dani    1x10 ea dani   SAQ        S/L SLR  1x10 each b/l 1x10 ea dani    1x10 ea dani      " "       Self HS Stretch (2 way) 30\"x4 each b/l  30\"x4 each b/l  20\" x4 ea dani    20\" x4 ea dani   Self Gastroc Stretch                                                      Ther Activity                                Gait Training                                Modalities          CP                      Access Code: [email or web address]  URL: [email or web address]  Date: 10/10/2024  Prepared by: Clara Claudio    Exercises  - Supine Bridge  - 2-3 x daily - 7 x weekly - 2 sets - 10 reps  - Seated Hamstring Stretch  - 2-3 x daily - 7 x weekly - 3 sets - 3-4 reps - 30sec hold  - Seated Table Hamstring Stretch  - 2-3 x daily - 7 x weekly - 1 sets - 3-4 reps - 30sec hold  - Gastroc Stretch on Wall  - 2-3 x daily - 7 x weekly - 1 sets - 3-4 reps - 30sec hold  - Standing Hamstring Curl with Resistance  - 2-3 x daily - 7 x weekly - 2 sets - 10 reps               "

## 2024-11-05 ENCOUNTER — OFFICE VISIT (OUTPATIENT)
Dept: PHYSICAL THERAPY | Facility: HOME HEALTHCARE | Age: 49
End: 2024-11-05
Payer: COMMERCIAL

## 2024-11-05 DIAGNOSIS — S76.311A STRAIN OF RIGHT HAMSTRING, INITIAL ENCOUNTER: ICD-10-CM

## 2024-11-05 DIAGNOSIS — S76.311A STRAIN OF RIGHT HAMSTRING MUSCLE, INITIAL ENCOUNTER: Primary | ICD-10-CM

## 2024-11-05 PROCEDURE — 97140 MANUAL THERAPY 1/> REGIONS: CPT

## 2024-11-05 PROCEDURE — 97110 THERAPEUTIC EXERCISES: CPT

## 2024-11-05 NOTE — PROGRESS NOTES
"Daily Note     Today's date: 2024  Patient name: Grey Miller  : 1975  MRN: [id number]  Referring provider: Serg De Leon,*  Dx:   Encounter Diagnosis     ICD-10-CM    1. Strain of right hamstring muscle, initial encounter  S76.311A       2. Strain of right hamstring, initial encounter  S76.311A                      Subjective: I am ok as long as I keep moving. Sitting and driving are the worst for me.     Objective: See treatment diary below    Assessment: Pt oral session well. A few vc's needed for correct form with ex and self stretch. Pt with HS tightness evident with foam roller and DTM. Pt reports feeling well with reduced R HS tightness at end of tx. . Patient would benefit from continued PT    Plan: Continue per plan of care.      Precautions: n/a      Manuals 11/1 11-5 10-22  10/25  10-30   HS, Quad, Gastroc   EC  R HS, gastroc  DF  R HS  EC  R HS/SL Quad    [place] Roller DF EC   DF  EC    TrP Release DF EC   DF - multiple locations along R HS due to fibrotic tissue  EC  R HS              Neuro Re-Ed           Balance as appropriate                                 Ther Ex  11-5 10-22  10/25  10-30   NuStep  L3 10' L3 10' L3  10'  L3 10'  L3  8'   Ankle pumps           HR/TR   x20     Standing Hip flex/abd/ext   X15 ea dani     Bridges w/ Ball squeeze   3\" 2x10     Step ups/ Step downs                LF Hamstring Curls 35# 2x15 35# 3x10 30# 2x10  30# 2x10   Leg Press 200# 2x15 200# 2x15 110# 1x10  160# 2x20    110# x10  160# 2x20   LF Hip ABD 60# 3x20 60# 3x20 60#  2x30x  60# 2x30           Hamstring Curls          Bridges  W/ball x20                          LAQ  On LF  35# 2x15 LF  35#  5\" 2x10 dani    30# 2x10   Hip add          Hip abd          Prone SLR   1x10 ea dani    1x10 ea adni   SAQ        S/L SLR   1x10 ea dani    1x10 ea dani             Self HS Stretch (2 way) 30\"x4 each b/l  30\"x4 each b/l  20\" x4 ea dani    20\" x4 ea dani   Self Gastroc Stretch                                   "                    Ther Activity                                Gait Training                                Modalities          CP                      Access Code: [email or web address]  URL: [email or web address]  Date: 10/10/2024  Prepared by: Clara Claudio    Exercises  - Supine Bridge  - 2-3 x daily - 7 x weekly - 2 sets - 10 reps  - Seated Hamstring Stretch  - 2-3 x daily - 7 x weekly - 3 sets - 3-4 reps - 30sec hold  - Seated Table Hamstring Stretch  - 2-3 x daily - 7 x weekly - 1 sets - 3-4 reps - 30sec hold  - Gastroc Stretch on Wall  - 2-3 x daily - 7 x weekly - 1 sets - 3-4 reps - 30sec hold  - Standing Hamstring Curl with Resistance  - 2-3 x daily - 7 x weekly - 2 sets - 10 reps

## 2024-11-07 ENCOUNTER — OFFICE VISIT (OUTPATIENT)
Dept: PHYSICAL THERAPY | Facility: HOME HEALTHCARE | Age: 49
End: 2024-11-07
Payer: COMMERCIAL

## 2024-11-07 DIAGNOSIS — S76.311A STRAIN OF RIGHT HAMSTRING MUSCLE, INITIAL ENCOUNTER: Primary | ICD-10-CM

## 2024-11-07 PROCEDURE — 97140 MANUAL THERAPY 1/> REGIONS: CPT

## 2024-11-07 PROCEDURE — 97110 THERAPEUTIC EXERCISES: CPT

## 2024-11-07 NOTE — PROGRESS NOTES
"Daily Note     Today's date: 2024  Patient name: Grey Miller  : 1975  MRN: [id number]  Referring provider: Serg De Leon,*  Dx: No diagnosis found.    Start Time: 0702          Subjective: Knots in my buttock and HS.     Objective: See treatment diary below    Assessment: Pt progressing well with PT visits. Held LF HS curl today 2* pt with report of occasional exacerbation of s/s. Pt reports consistency with HEP and self stretch. Pt remains with tight HS musculature with he reports relief with foam roller and deep pressure massage. Patient would benefit from continued PT    Plan: Continue per plan of care.      Precautions: n/a      Manuals 11/1 11-5 11-7  10/25  10-30   HS, Quad, Gastroc     DF  R HS  EC  R HS/SL Quad    Tiger Roller DF EC EC  DF  EC    TrP Release DF EC EC  DF - multiple locations along R HS due to fibrotic tissue  EC  R HS              Neuro Re-Ed           Balance as appropriate                                 Ther Ex  11-5 11-7  10/25  10-30   NuStep  L3 10' L3 10' L3  10'  L3 10'  L3  8'   Ankle pumps           HR/TR   x20     Standing Hip flex/abd/ext   X15 ea dani     Bridges w/ Ball squeeze   3\" 2x10     Step ups/ Step downs                LF Hamstring Curls 35# 2x15 35# 3x10 Held  30# 2x10   Leg Press 200# 2x15 200# 2x15 200# 3 x15    110# x10  160# 2x20   LF Hip ABD 60# 3x20 60# 3x20 60#  3x30  60# 2x30           Hamstring Curls                            LAQ  On LF  35# 2x15 LF  35# 2x10 35#  2x10 dani    30# 2x10   Hip add          Hip abd          Prone SLR       1x10 ea dani   SAQ        S/L SLR       1x10 ea dani             Self HS Stretch (2 way) 30\"x4 each b/l  30\"x4 each b/l  20\" x4 ea dani    20\" x4 ea dani   Self Gastroc Stretch                                                      Ther Activity                                Gait Training                                Modalities          CP                      Access Code: [email or web address]  URL: " [email or web address]  Date: 10/10/2024  Prepared by: Clara Claudio    Exercises  - Supine Bridge  - 2-3 x daily - 7 x weekly - 2 sets - 10 reps  - Seated Hamstring Stretch  - 2-3 x daily - 7 x weekly - 3 sets - 3-4 reps - 30sec hold  - Seated Table Hamstring Stretch  - 2-3 x daily - 7 x weekly - 1 sets - 3-4 reps - 30sec hold  - Gastroc Stretch on Wall  - 2-3 x daily - 7 x weekly - 1 sets - 3-4 reps - 30sec hold  - Standing Hamstring Curl with Resistance  - 2-3 x daily - 7 x weekly - 2 sets - 10 reps                    184.9

## 2024-11-08 ENCOUNTER — APPOINTMENT (OUTPATIENT)
Dept: PHYSICAL THERAPY | Facility: HOME HEALTHCARE | Age: 49
End: 2024-11-08
Payer: COMMERCIAL

## 2024-11-12 ENCOUNTER — APPOINTMENT (OUTPATIENT)
Dept: PHYSICAL THERAPY | Facility: HOME HEALTHCARE | Age: 49
End: 2024-11-12
Payer: COMMERCIAL

## 2024-11-19 ENCOUNTER — OFFICE VISIT (OUTPATIENT)
Dept: PHYSICAL THERAPY | Facility: HOME HEALTHCARE | Age: 49
End: 2024-11-19
Payer: COMMERCIAL

## 2024-11-19 DIAGNOSIS — S76.311A STRAIN OF RIGHT HAMSTRING MUSCLE, INITIAL ENCOUNTER: Primary | ICD-10-CM

## 2024-11-19 DIAGNOSIS — S76.311A STRAIN OF RIGHT HAMSTRING, INITIAL ENCOUNTER: ICD-10-CM

## 2024-11-19 PROCEDURE — 97140 MANUAL THERAPY 1/> REGIONS: CPT

## 2024-11-19 PROCEDURE — 97110 THERAPEUTIC EXERCISES: CPT

## 2024-11-19 NOTE — PROGRESS NOTES
"PT Re-Evaluation     Today's date: 2024  Patient name: Grey Miller  : 1975  MRN: [id number]  Referring provider: Serg De Leon,*  Dx:   Encounter Diagnosis     ICD-10-CM    1. Strain of right hamstring muscle, initial encounter  S76.311A       2. Strain of right hamstring, initial encounter  S76.311A                      Assessment  Impairments: abnormal or restricted ROM, lacks appropriate home exercise program, pain with function, weight-bearing intolerance, poor posture , participation limitations, activity limitations and endurance  Other impairment: decreased flexibility  Symptom irritability: moderate    Assessment details: The patient has been seen in PT for a total of 10 visits since SOC.  He has made improvements and progress towards his goals.  He demonstrates decreased ROM and strength, decreased flexibility and TTP.  These deficits lead to pain with ambulation, sitting > 30 minutes and decreased tolerance to functional activities.  Improved stair negotiation noted, he can go up and down the steps with reciprocal gait pattern.  He notes most pain when sitting, especially when driving in the car.  Improved 5x STS time since his IE, decreased by 4\".  The patient would benefit from continued PT to address deficits and improve function.  Tx to include ROM, stretching, strengthening, modalities, HEP, pt education, postural ed, lifting/body mechanics, neuro re-ed, balance/proprioception Te, MT and equipment.  Plan to continue with PT and will progress as able in upcoming visits with ROM, stretching, stretching, flexibility, gait, balance and HEP.       Understanding of Dx/Px/POC: good     Prognosis: good    Goals  STG:  -Pt will improve pain from 8/10 to 5/10 at worst to allow reduced difficulty performing ADLs due to pain in 4 weeks. - Progressing  -Pt will increase R knee flexion AROM from 115* to 120* to allow pt w/ improved ability to ascend/descend stairs w/ less difficulty in 4 " weeks. - Met  -Pt will increase R HS strength from 4/5 to 4+/5 to allow pt w/ improved ability of performing a STS tx w/ less difficulty in 4 weeks. - Met    LTG:  -Pt will be d/c from OP PT w/ I HEP to allow pt to continue improving upon their impairments for improved ADLs/ recreational activities in 12 weeks.  -Pt will improve sitting tolerance from 30mins to >1hr to allow improved ability to perform ADLs/ recreational activities w/o need for a rest break in 12 weeks.  -Pt will improve 5xSTS time from 16.24s to <13s to show increased gait speed demonstrating ability to walk across street w/ less difficulty in 12 weeks.     Plan  Patient would benefit from: skilled physical therapy  Planned modality interventions: cryotherapy, unattended electrical stimulation and thermotherapy: hydrocollator packs    Planned therapy interventions: home exercise program, therapeutic exercise, therapeutic activities, stretching, strengthening, functional ROM exercises, flexibility, neuromuscular re-education, manual therapy, massage, balance/weight bearing training, abdominal trunk stabilization, balance, patient/caregiver education, postural training, self care, graded activity and graded exercise    Frequency: 2x week  Duration in weeks: 8  Plan of Care beginning date: 11/22/2024  Plan of Care expiration date: 1/17/2025  Treatment plan discussed with: patient  Plan details: Modalities and therapy interventions prn.          Subjective Evaluation    History of Present Illness  Date of onset: 9/27/2024  Mechanism of injury: trauma  Mechanism of injury: Pt states that after feeling pain, he states that his gait had changed due to the pain.  Pain is intermittent radiating from hip to knee.  Pt states that he is able to ambulate his distances w/o needing a break, but pain is there and he pushes through.  Once pain begins it stays throughout day, pt states that for first 5-10mins there is no pain in the morning but then it presents.  Pt  "states that he can stand w/o complications, most pain is when he sits at work.  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  Pt states that he has pain w/ climbing stairs but is not limited to perform stair climbing.  Pt is a S/L sleeper and does not have problems due to HS pain.  Pt states that job duties have been \"lessened\" to tolerance due to pain.  Pt not taking meds for pain.      From Ortho Dr. De Leon encounter note from 10/2/24, \"R hip/HS pain started last Friday when he was helping a neighbor lift something heavy.  He did feel a pop and had pain in his posterior hip.  He presented to the ER where he was diagnosed with a strain.  Since the injury the pain has been improving as is his ability to ambulate.  He locates the pain to the posterior upper thigh.  And feels sometimes there is a small marblelike object that he is sitting on.  He denies any prior injury to this leg.  Denies numbness or tingling in the leg.\"    UPDATE 24:  The patient states that he has been having less pain than before but he still does get pain.  He notes that he is walking better without a limp.  He gets cramps in his leg, especially when sitting or when driving.  Despite still getting pain and cramping he does note that overall he feels therapy has been helping.        He does not have a follow up appointment scheduled with his doctor at this time, to see him as needed.    Quality of life: good    Patient Goals  Patient goals for therapy: increased strength, decreased pain, independence with ADLs/IADLs, return to sport/leisure activities, improved balance, increased motion and return to work  Patient goal: Pt wants to improve pain to return to full duty at work w/o complications.  Pain  At best pain ratin  At worst pain ratin  Location: R Hip/Hamstring  Quality: sharp and pressure  Relieving factors: change in position  Aggravating factors: sitting, walking and lifting    Social " "Support  Stairs in house: yes   Lives with: significant other    Employment status: working    Diagnostic Tests  X-ray: normal  Treatments  Current treatment: physical therapy        Objective     Tenderness     Right Hip   Tenderness in the ischial tuberosity.     Active Range of Motion   Left Hip   Normal active range of motion    Right Hip   Normal active range of motion  Left Knee   Flexion: 120 degrees   Extension: WFL    Right Knee   Flexion: 120 degrees   Extension: WFL    Strength/Myotome Testing     Left Hip   Planes of Motion   Flexion: 5  Extension: 5  Abduction: 5  Adduction: 5    Right Hip   Planes of Motion   Flexion: 4  Extension: 4+  Abduction: 5  Adduction: 5    Left Knee   Flexion: 5  Extension: 5    Right Knee   Flexion: 4+  Extension: 5    Tests     Left Hip   Arley: Positive.   SLR: Positive.     Right Hip   Arley: Positive.   SLR: Positive.     Ambulation     Observational Gait   Gait: antalgic   Decreased walking speed, right stance time and right step length.     Functional Assessment        Comments  5x STS = 16.24\" at IE  5x STS = 12.3\" at RE             Precautions: n/a      Manuals 11/1 11-5 11-7 11/19 11/22   HS, Quad, Gastroc         Tiger Roller DF EC EC MB ML    TrP Release DF EC EC MB ML             Neuro Re-Ed          Balance as appropriate                              Ther Ex  11-5 11-7     NuStep  L3 10' L3 10' L3  10' L3 10' L3 10'   Ankle pumps         HR/TR   x20 x20 20x   Standing Hip flex/abd/ext   X15 ea sulaiman X15 ea sulaiman 15x ea Sulaiman   Bridges w/ Ball squeeze   3\" 2x10 3\" 2x10 3\" 2x10   Step ups/ Step downs                LF Hamstring Curls 35# 2x15 35# 3x10 Held nv NP Resume NV   Leg Press 200# 2x15 200# 2x15 200# 3 x15   200# 3 x15 200# 3x15   LF Hip ABD 60# 3x20 60# 3x20 60#  3x30 60#  3x30 60# 3x30           Hamstring Curls                          LAQ  On LF  35# 2x15 LF  35# 2x10 35#  2x10 sulaiman 35#  3x10 sulaiman 35# 3x10 Sulaiman   Hip add         Hip abd         Prone SLR      " "  SAQ        S/L SLR                Self HS Stretch (2 way) 30\"x4 each b/l  30\"x4 each b/l  20\" x4 ea sulaiman 20\" x4 ea sulaiman 20\"x4 ea Sulaiman   Self Gastroc Stretch                                                 Ther Activity                             Gait Training                             Modalities         CP                    Access Code: [email or web address]  URL: [email or web address]  Date: 10/10/2024  Prepared by: Clara Claudio    Exercises  - Supine Bridge  - 2-3 x daily - 7 x weekly - 2 sets - 10 reps  - Seated Hamstring Stretch  - 2-3 x daily - 7 x weekly - 3 sets - 3-4 reps - 30sec hold  - Seated Table Hamstring Stretch  - 2-3 x daily - 7 x weekly - 1 sets - 3-4 reps - 30sec hold  - Gastroc Stretch on Wall  - 2-3 x daily - 7 x weekly - 1 sets - 3-4 reps - 30sec hold  - Standing Hamstring Curl with Resistance  - 2-3 x daily - 7 x weekly - 2 sets - 10 reps         "

## 2024-11-19 NOTE — PROGRESS NOTES
"Daily Note     Today's date: 2024  Patient name: Grey Miller  : 1975  MRN: [id number]  Referring provider: Serg De Leon,*  Dx:   Encounter Diagnosis     ICD-10-CM    1. Strain of right hamstring muscle, initial encounter  S76.311A       2. Strain of right hamstring, initial encounter  S76.311A                      Subjective: Tight spots along the HS are not as bad. He is still doing HS stretches at home.        Objective: See treatment diary below      Assessment: Tolerated treatment well. PREs and self stretching completed without adverse effects. Limited HS extensibility note with self stretching. Patient continues to benefit from manuals to address R HS soft tissue restrictions. Continued PT would be beneficial to improve function.          Plan: Continue per plan of care.       Precautions: n/a      Manuals   10-30   HS, Quad, Gastroc      EC  R HS/SL Quad    Tiger Roller DF EC EC MB  EC    TrP Release DF EC EC MB  EC  R HS             Neuro Re-Ed          Balance as appropriate                              Ther Ex     10-30   NuStep  L3 10' L3 10' L3  10' L3 10'  L3  8'   Ankle pumps          HR/TR   x20 x20    Standing Hip flex/abd/ext   X15 ea dani X15 ea dani    Bridges w/ Ball squeeze   3\" 2x10 3\" 2x10    Step ups/ Step downs                LF Hamstring Curls 35# 2x15 35# 3x10 Held nv 30# 2x10   Leg Press 200# 2x15 200# 2x15 200# 3 x15   200# 3 x15 110# x10  160# 2x20   LF Hip ABD 60# 3x20 60# 3x20 60#  3x30 60#  3x30 60# 2x30           Hamstring Curls                          LAQ  On LF  35# 2x15 LF  35# 2x10 35#  2x10 dani 35#  3x10 dani  30# 2x10   Hip add         Hip abd         Prone SLR      1x10 ea dani   SAQ        S/L SLR      1x10 ea dani            Self HS Stretch (2 way) 30\"x4 each b/l  30\"x4 each b/l  20\" x4 ea dani 20\" x4 ea dani  20\" x4 ea dani   Self Gastroc Stretch                                                 Ther Activity                 "             Gait Training                             Modalities         CP                    Access Code: [email or web address]  URL: [email or web address]  Date: 10/10/2024  Prepared by: Clara Claudio    Exercises  - Supine Bridge  - 2-3 x daily - 7 x weekly - 2 sets - 10 reps  - Seated Hamstring Stretch  - 2-3 x daily - 7 x weekly - 3 sets - 3-4 reps - 30sec hold  - Seated Table Hamstring Stretch  - 2-3 x daily - 7 x weekly - 1 sets - 3-4 reps - 30sec hold  - Gastroc Stretch on Wall  - 2-3 x daily - 7 x weekly - 1 sets - 3-4 reps - 30sec hold  - Standing Hamstring Curl with Resistance  - 2-3 x daily - 7 x weekly - 2 sets - 10 reps

## 2024-11-20 LAB — COLOGUARD RESULT REPORTABLE: NORMAL

## 2024-11-22 ENCOUNTER — EVALUATION (OUTPATIENT)
Dept: PHYSICAL THERAPY | Facility: HOME HEALTHCARE | Age: 49
End: 2024-11-22
Payer: COMMERCIAL

## 2024-11-22 DIAGNOSIS — S76.311A STRAIN OF RIGHT HAMSTRING, INITIAL ENCOUNTER: ICD-10-CM

## 2024-11-22 DIAGNOSIS — S76.311A STRAIN OF RIGHT HAMSTRING MUSCLE, INITIAL ENCOUNTER: Primary | ICD-10-CM

## 2024-11-22 PROCEDURE — 97110 THERAPEUTIC EXERCISES: CPT | Performed by: PHYSICAL THERAPIST

## 2024-11-22 PROCEDURE — 97140 MANUAL THERAPY 1/> REGIONS: CPT | Performed by: PHYSICAL THERAPIST

## 2024-11-26 ENCOUNTER — OFFICE VISIT (OUTPATIENT)
Dept: PHYSICAL THERAPY | Facility: HOME HEALTHCARE | Age: 49
End: 2024-11-26
Payer: COMMERCIAL

## 2024-11-26 DIAGNOSIS — S76.311A STRAIN OF RIGHT HAMSTRING MUSCLE, INITIAL ENCOUNTER: Primary | ICD-10-CM

## 2024-11-26 DIAGNOSIS — S76.311A STRAIN OF RIGHT HAMSTRING, INITIAL ENCOUNTER: ICD-10-CM

## 2024-11-26 PROCEDURE — 97140 MANUAL THERAPY 1/> REGIONS: CPT | Performed by: PHYSICAL THERAPIST

## 2024-11-26 PROCEDURE — 97110 THERAPEUTIC EXERCISES: CPT | Performed by: PHYSICAL THERAPIST

## 2024-11-26 NOTE — PROGRESS NOTES
"Daily Note     Today's date: 2024  Patient name: Grey Miller  : 1975  MRN: [id number]  Referring provider: Serg De Leon,*  Dx:   Encounter Diagnosis     ICD-10-CM    1. Strain of right hamstring muscle, initial encounter  S76.311A       2. Strain of right hamstring, initial encounter  S76.311A                      Subjective: Pt reporting that he is feeling better but still has soreness.       Objective: See treatment diary below      Assessment: PT progressed weighted resistance and bridges to single leg this date.  Pt with mild fatigue to end session and self limiting on reps to avoid strain to B HS.  PT to continue with POC progression in order to return to PLOF without symptoms.     Plan: Continue per plan of care.      Precautions: n/a      Manuals    HS, Quad, Gastroc         Tiger Roller HZ  EC EC MB ML    TrP Release  EC EC MB ML             Neuro Re-Ed          Balance as appropriate                              Ther Ex       NuStep  L3 10'  L3 10' L3  10' L3 10' L3 10'   Ankle pumps         HR/TR   x20 x20 20x   Standing Hip flex/abd/ext 1.5# x 15 ea sulaiman   X15 ea sulaiman X15 ea sulaiman 15x ea Sulaiman   Bridges w/ Ball squeeze Single leg   X 10 sulaiman   3\" 2x10 3\" 2x10 3\" 2x10   Step ups/ Step downs                LF Hamstring Curls 35#   3 x 10  35# 3x10 Held nv NP Resume NV   Leg Press 220#   3 x 15  200# 2x15 200# 3 x15   200# 3 x15 200# 3x15   LF Hip ABD 65# 3 x 15  Increase NV  60# 3x20 60#  3x30 60#  3x30 60# 3x30           Hamstring Curls                          LAQ  LF 35#   3 x 10 sulaiman  LF  35# 2x10 35#  2x10 sulaiman 35#  3x10 sulaiman 35# 3x10 Sulaiman   Hip add         Hip abd         Prone SLR        SAQ        S/L SLR                Self HS Stretch (2 way)  30\"x4 each b/l  20\" x4 ea sulaiman 20\" x4 ea sulaiman 20\"x4 ea Sulaiman   Self Gastroc Stretch                                                 Ther Activity                             Gait Training                        "      Modalities         CP                    Access Code: [email or web address]  URL: [email or web address]  Date: 10/10/2024  Prepared by: Clara Claudio    Exercises  - Supine Bridge  - 2-3 x daily - 7 x weekly - 2 sets - 10 reps  - Seated Hamstring Stretch  - 2-3 x daily - 7 x weekly - 3 sets - 3-4 reps - 30sec hold  - Seated Table Hamstring Stretch  - 2-3 x daily - 7 x weekly - 1 sets - 3-4 reps - 30sec hold  - Gastroc Stretch on Wall  - 2-3 x daily - 7 x weekly - 1 sets - 3-4 reps - 30sec hold  - Standing Hamstring Curl with Resistance  - 2-3 x daily - 7 x weekly - 2 sets - 10 reps

## 2024-11-27 ENCOUNTER — RESULTS FOLLOW-UP (OUTPATIENT)
Dept: INTERNAL MEDICINE CLINIC | Facility: CLINIC | Age: 49
End: 2024-11-27

## 2024-11-27 NOTE — TELEPHONE ENCOUNTER
Pt called back, said Avril did reach out to him, however he still has not received the new one yet. Advised with Emy, she will look into this and give pt a call back.

## 2024-12-02 ENCOUNTER — OFFICE VISIT (OUTPATIENT)
Dept: PHYSICAL THERAPY | Facility: HOME HEALTHCARE | Age: 49
End: 2024-12-02
Payer: COMMERCIAL

## 2024-12-02 DIAGNOSIS — S76.311A STRAIN OF RIGHT HAMSTRING MUSCLE, INITIAL ENCOUNTER: Primary | ICD-10-CM

## 2024-12-02 DIAGNOSIS — S76.311A STRAIN OF RIGHT HAMSTRING, INITIAL ENCOUNTER: ICD-10-CM

## 2024-12-02 PROCEDURE — 97140 MANUAL THERAPY 1/> REGIONS: CPT | Performed by: PHYSICAL THERAPIST

## 2024-12-02 PROCEDURE — 97110 THERAPEUTIC EXERCISES: CPT | Performed by: PHYSICAL THERAPIST

## 2024-12-02 NOTE — PROGRESS NOTES
"Daily Note     Today's date: 2024  Patient name: Grey Miller  : 1975  MRN: [id number]  Referring provider: Serg De Leon,*  Dx:   Encounter Diagnosis     ICD-10-CM    1. Strain of right hamstring muscle, initial encounter  S76.311A       2. Strain of right hamstring, initial encounter  S76.311A                      Subjective: Pt reporting that he could feel a difference, with increased pain, with only coming once last week.       Objective: See treatment diary below      Assessment: Pt tolerable to program but with soreness towards end reps.  He remains with soft tissue restrictions R > L HS with foam roller application.  Reviewed piriformis stretch for HEP to address continued proximal tightness.     Plan: Continue per plan of care.      Precautions: n/a      Manuals  11-7    HS, Quad, Gastroc         Tiger Roller HZ  HZ EC MB ML    TrP Release   EC MB ML             Neuro Re-Ed          Balance as appropriate                              Ther Ex        NuStep  L3 10'  L4 10 min  L3  10' L3 10' L3 10'   Ankle pumps         HR/TR   x20 x20 20x   Standing Hip flex/abd/ext 1.5# x 15 ea sulaiman  2.5#   X 20 ea sulaiman  X15 ea sulaiman X15 ea sulaiman 15x ea Sulaiman   Bridges w/ Ball squeeze Single leg   X 10 sulaiman  Single leg   X 10 sulaiman  3\" 2x10 3\" 2x10 3\" 2x10   Step ups/ Step downs                LF Hamstring Curls 35#   3 x 10  35#   3 x 10  Held nv NP Resume NV   Leg Press 220#   3 x 15  220#   2 x 15  200# 3 x15   200# 3 x15 200# 3x15   LF Hip ABD 65# 3 x 15   65#   3 x 15   Increase NV 60#  3x30 60#  3x30 60# 3x30           Hamstring Curls                          LAQ  LF 35#   3 x 10 sulaiman  35#   Increase NV   3 x 15 35#  2x10 sulaiman 35#  3x10 sulaiman 35# 3x10 Sulaiman   Hip add         Hip abd         Prone SLR        SAQ        S/L SLR                Self HS Stretch (2 way)  Reviewed seated piriformis stretch for HEP  20\" x4 ea sulaiman 20\" x4 ea sulaiman 20\"x4 ea Sulaiman   Self Gastroc Stretch                 "                                 Ther Activity                             Gait Training                             Modalities         CP                    Access Code: [email or web address]  URL: [email or web address]  Date: 10/10/2024  Prepared by: Clara Claudio    Exercises  - Supine Bridge  - 2-3 x daily - 7 x weekly - 2 sets - 10 reps  - Seated Hamstring Stretch  - 2-3 x daily - 7 x weekly - 3 sets - 3-4 reps - 30sec hold  - Seated Table Hamstring Stretch  - 2-3 x daily - 7 x weekly - 1 sets - 3-4 reps - 30sec hold  - Gastroc Stretch on Wall  - 2-3 x daily - 7 x weekly - 1 sets - 3-4 reps - 30sec hold  - Standing Hamstring Curl with Resistance  - 2-3 x daily - 7 x weekly - 2 sets - 10 reps

## 2024-12-04 ENCOUNTER — OFFICE VISIT (OUTPATIENT)
Dept: PHYSICAL THERAPY | Facility: HOME HEALTHCARE | Age: 49
End: 2024-12-04
Payer: COMMERCIAL

## 2024-12-04 DIAGNOSIS — S76.311A STRAIN OF RIGHT HAMSTRING, INITIAL ENCOUNTER: ICD-10-CM

## 2024-12-04 DIAGNOSIS — S76.311A STRAIN OF RIGHT HAMSTRING MUSCLE, INITIAL ENCOUNTER: Primary | ICD-10-CM

## 2024-12-04 PROCEDURE — 97110 THERAPEUTIC EXERCISES: CPT

## 2024-12-04 PROCEDURE — 97140 MANUAL THERAPY 1/> REGIONS: CPT

## 2024-12-04 NOTE — PROGRESS NOTES
"Daily Note     Today's date: 2024  Patient name: Grey Miller  : 1975  MRN: [id number]  Referring provider: Serg De Leon,*  Dx:   Encounter Diagnosis     ICD-10-CM    1. Strain of right hamstring muscle, initial encounter  S76.311A       2. Strain of right hamstring, initial encounter  S76.311A                      Subjective: Pt reports he has no pain this morning.       Objective: See treatment diary below      Assessment: Tolerated treatment well. Progressed to increased weight with LF hip abd and LF leg extension machine today with good tolerance. Soft tissue restrictions still noted R > L HS with foam roller application. Pt with no c/o pain t/o session or at end of session. Patient would benefit from continued PT      Plan: Continue per plan of care.      Precautions: n/a      Manuals    HS, Quad, Gastroc         Tiger Roller HZ  HZ JK MB ML    TrP Release    MB ML             Neuro Re-Ed          Balance as appropriate                              Ther Ex        NuStep  L3 10'  L4 10 min  L4  10' L3 10' L3 10'   Ankle pumps         HR/TR    x20 20x   Standing Hip flex/abd/ext 1.5# x 15 ea sulaiman  2.5#   X 20 ea sulaiman  2.5# x 20 ea Sulaiman  X15 ea sulaiman 15x ea Sulaiman   Bridges w/ Ball squeeze Single leg   X 10 sulaiman  Single leg   X 10 sulaiman  Single leg   X 10 Sulaiman 3\" 2x10 3\" 2x10   Step ups/ Step downs                LF Hamstring Curls 35#   3 x 10  35#   3 x 10  35#   3x10 nv NP Resume NV   Leg Press 220#   3 x 15  220#   2 x 15  220#   3 x15   200# 3 x15 200# 3x15   LF Hip ABD 65# 3 x 15   65#   3 x 15   Increase NV 75#    3x15 60#  3x30 60# 3x30           Hamstring Curls                          LAQ  LF 35#   3 x 10 sulaiman  35#   Increase NV   3 x 15 45#   3x10 sulaiman 35#  3x10 sulaiman 35# 3x10 Sulaiman   Hip add         Hip abd         Prone SLR        SAQ        S/L SLR                Self HS Stretch (2 way)  Reviewed seated piriformis stretch for HEP   20\" x4 ea sulaiman 20\"x4 ea Sulaiman   Self " Gastroc Stretch                                                 Ther Activity                             Gait Training                             Modalities         CP                    Access Code: [email or web address]  URL: [email or web address]  Date: 10/10/2024  Prepared by: Clara Claudio    Exercises  - Supine Bridge  - 2-3 x daily - 7 x weekly - 2 sets - 10 reps  - Seated Hamstring Stretch  - 2-3 x daily - 7 x weekly - 3 sets - 3-4 reps - 30sec hold  - Seated Table Hamstring Stretch  - 2-3 x daily - 7 x weekly - 1 sets - 3-4 reps - 30sec hold  - Gastroc Stretch on Wall  - 2-3 x daily - 7 x weekly - 1 sets - 3-4 reps - 30sec hold  - Standing Hamstring Curl with Resistance  - 2-3 x daily - 7 x weekly - 2 sets - 10 reps

## 2024-12-09 ENCOUNTER — OFFICE VISIT (OUTPATIENT)
Dept: PHYSICAL THERAPY | Facility: HOME HEALTHCARE | Age: 49
End: 2024-12-09
Payer: COMMERCIAL

## 2024-12-09 DIAGNOSIS — S76.311A STRAIN OF RIGHT HAMSTRING MUSCLE, INITIAL ENCOUNTER: Primary | ICD-10-CM

## 2024-12-09 LAB — COLOGUARD RESULT REPORTABLE: NEGATIVE

## 2024-12-09 PROCEDURE — 97110 THERAPEUTIC EXERCISES: CPT

## 2024-12-09 NOTE — PROGRESS NOTES
"Daily Note     Today's date: 2024  Patient name: Grey Miller  : 1975  MRN: [id number]  Referring provider: Serg De Leon,*  Dx: No diagnosis found.    Start Time: 708          Subjective: I am doing well and have no pain. I have most of my pain after sitting for a while.     Objective: See treatment diary below    Assessment: Pt oral session well and was able to oral increased wt with standing 3 way today. Pt reports good challenge with ex performed on equipment and ableto complete with minimal rests needed.  Pt reports continuation with HEP and self stretch. Pt remains with HS tightness noted during foam roller. Pt would benefit from continued PT    Plan: Continue per plan of care.      Precautions: n/a      Manuals    HS, Quad, Gastroc         Tiger Roller HZ  HZ JK EC ML    TrP Release     ML             Neuro Re-Ed          Balance as appropriate                              Ther Ex        NuStep  L3 10'  L4 10 min  L4  10' L4 8' L3 10'   Ankle pumps         HR/TR     20x   Standing Hip flex/abd/ext 1.5# x 15 ea dani  2.5#   X 20 ea dani  2.5# x 20 ea Dani  3# ea dani 15x ea Dani   Bridges w/ Ball squeeze Single leg   X 10 dani  Single leg   X 10 dani  Single leg   X 10 Dain Single leg   X 10 3\" 2x10   Step ups/ Step downs                LF Hamstring Curls 35#   3 x 10  35#   3 x 10  35#   3x10 35#   3x10 NP Resume NV   Leg Press 220#   3 x 15  220#   2 x 15  220#   3 x15   220# 3 x15 200# 3x15   LF Hip ABD 65# 3 x 15   65#   3 x 15   Increase NV 75#    3x15 75#  3x15 60# 3x30           Hamstring Curls                          LAQ  LF 35#   3 x 10 dani  35#   Increase NV   3 x 15 45#   3x10 dani 45#  3x10 dani 35# 3x10 Dani   Hip add         Hip abd         Prone SLR        SAQ        S/L SLR                Self HS Stretch (2 way)  Reviewed seated piriformis stretch for HEP    20\"x4 ea Dani   Self Gastroc Stretch                                                 Ther " Activity                             Gait Training                             Modalities         CP                    Access Code: [email or web address]  URL: [email or web address]  Date: 10/10/2024  Prepared by: Clara Claudio    Exercises  - Supine Bridge  - 2-3 x daily - 7 x weekly - 2 sets - 10 reps  - Seated Hamstring Stretch  - 2-3 x daily - 7 x weekly - 3 sets - 3-4 reps - 30sec hold  - Seated Table Hamstring Stretch  - 2-3 x daily - 7 x weekly - 1 sets - 3-4 reps - 30sec hold  - Gastroc Stretch on Wall  - 2-3 x daily - 7 x weekly - 1 sets - 3-4 reps - 30sec hold  - Standing Hamstring Curl with Resistance  - 2-3 x daily - 7 x weekly - 2 sets - 10 reps

## 2024-12-11 ENCOUNTER — OFFICE VISIT (OUTPATIENT)
Dept: PHYSICAL THERAPY | Facility: HOME HEALTHCARE | Age: 49
End: 2024-12-11
Payer: COMMERCIAL

## 2024-12-11 DIAGNOSIS — S76.311A STRAIN OF RIGHT HAMSTRING MUSCLE, INITIAL ENCOUNTER: Primary | ICD-10-CM

## 2024-12-11 PROCEDURE — 97140 MANUAL THERAPY 1/> REGIONS: CPT

## 2024-12-11 PROCEDURE — 97110 THERAPEUTIC EXERCISES: CPT

## 2024-12-11 NOTE — PROGRESS NOTES
"Daily Note     Today's date: 2024  Patient name: Grey Miller  : 1975  MRN: [id number]  Referring provider: Serg De Leon,*  Dx: No diagnosis found.    Start Time: 0700          Subjective: Pt reports soreness at R HS after last visit and it is still a little sore. Mild pain of 2-3/10.     Objective: See treatment diary below    Assessment: Pt oral session well with average pain of 2-10 t/o ex. Held LF HS curl and modified to seated t-band HS curl and pt oral well. Encouraged consistency with HEP and self stretch. Patient would benefit from continued PT    Plan: Continue per plan of care.      Precautions: n/a      Manuals    HS, Quad, Gastroc         Tiger Roller HZ  HZ JK EC EC    TrP Release                  Neuro Re-Ed          Balance as appropriate                              Ther Ex       NuStep  L3 10'  L4 10 min  L4  10' L4 8' L4 10'   Ankle pumps         HR/TR        Standing Hip flex/abd/ext 1.5# x 15 ea dani  2.5#   X 20 ea dani  2.5# x 20 ea Dani  3# x30 ea dani 4# x 30 ea dani   Bridges w/ Ball squeeze Single leg   X 10 dani  Single leg   X 10 dani  Single leg   X 10 Dani Single leg   X 10 Single leg  3\" x10 dani   Step ups/ Step downs                LF Hamstring Curls 35#   3 x 10  35#   3 x 10  35#   3x10 35#   3x10 NP Resume NV   Leg Press 220#   3 x 15  220#   2 x 15  220#   3 x15   220# 3 x15 220# 3x15   LF Hip ABD 65# 3 x 15   65#   3 x 15   Increase NV 75#    3x15 75#  3x15 75# 3x20           Hamstring Curls      Blue t-band  X20 dani                      LAQ  LF 35#   3 x 10 dani  35#   Increase NV   3 x 15 45#   3x10 dani 45#  3x10 dani 45# 3x10 Dani   Hip add         Hip abd         Prone SLR        SAQ        S/L SLR                Self HS Stretch (2 way)  Reviewed seated piriformis stretch for HEP       Self Gastroc Stretch                                                 Ther Activity                             Gait Training                    "          Modalities         CP                    Access Code: [email or web address]  URL: [email or web address]  Date: 10/10/2024  Prepared by: Clara Claudio    Exercises  - Supine Bridge  - 2-3 x daily - 7 x weekly - 2 sets - 10 reps  - Seated Hamstring Stretch  - 2-3 x daily - 7 x weekly - 3 sets - 3-4 reps - 30sec hold  - Seated Table Hamstring Stretch  - 2-3 x daily - 7 x weekly - 1 sets - 3-4 reps - 30sec hold  - Gastroc Stretch on Wall  - 2-3 x daily - 7 x weekly - 1 sets - 3-4 reps - 30sec hold  - Standing Hamstring Curl with Resistance  - 2-3 x daily - 7 x weekly - 2 sets - 10 reps

## 2024-12-12 DIAGNOSIS — I10 ESSENTIAL HYPERTENSION: ICD-10-CM

## 2024-12-12 RX ORDER — LISINOPRIL AND HYDROCHLOROTHIAZIDE 20; 25 MG/1; MG/1
1 TABLET ORAL DAILY
Qty: 30 TABLET | Refills: 5 | Status: SHIPPED | OUTPATIENT
Start: 2024-12-12

## 2024-12-12 NOTE — TELEPHONE ENCOUNTER
Reason for call:   [x] Refill   [] Prior Auth  [] Other:     Office:   [x] PCP/Provider -   [] Specialty/Provider -     Medication: lisinopril-hydrochlorothiazide (PRINZIDE,ZESTORETIC) 20-25 MG per tablet  Take 1 tablet by mouth daily       Pharmacy: [facility] [address] [place] [address], [address]      Does the patient have enough for 3 days?   [] Yes   [x] No - Send as HP to POD

## 2024-12-13 NOTE — PROGRESS NOTES
"Daily Note     Today's date: 2024  Patient name: Grey Miller  : 1975  MRN: [id number]  Referring provider: Serg De Leon,*  Dx:   Encounter Diagnosis     ICD-10-CM    1. Strain of right hamstring muscle, initial encounter  S76.311A       2. Strain of right hamstring, initial encounter  S76.311A                      Subjective: The patient states that he is doing good today, no pain at start of session.  He still does get pain but mostly when sitting.          Objective: See treatment diary below      Assessment: Tolerated treatment well.  Progressed weights with machines as outlined below.  He was able to complete 2 sets of 10 reps for HS curls at 45#, some discomfort so dropped last set down to 35#.  No pain at end of session.  Patient demonstrated fatigue post treatment and would benefit from continued PT to improve function.        Plan: Continue per plan of care.      Precautions: n/a      Manuals   12-   HS, Quad, Gastroc         [place] Roller ML HZ JK EC EC    TrP Release                  Neuro Re-Ed          Balance as appropriate                              Ther Ex     12-11   NuStep  L4 10'  L4 10 min  L4  10' L4 8' L4 10'   Ankle pumps         HR/TR        Standing Hip flex/abd/ext 4# 30x ea Sulaiman 2.5#   X 20 ea sulaiman  2.5# x 20 ea Sulaiman  3# x30 ea sulaiman 4# x 30 ea sulaiman   Bridges w/ Ball squeeze Single leg no ball  3\"x10 sulaiman Single leg   X 10 sulaiman  Single leg   X 10 Sulaiman Single leg   X 10 Single leg  3\" x10 sulaiman   Step ups/ Step downs                LF Hamstring Curls 45# 2x10  35# 1x10 35#   3 x 10  35#   3x10 35#   3x10 NP Resume NV   Leg Press 220#   3x15  220#   2 x 15  220#   3 x15   220# 3 x15 220# 3x15   LF Hip ABD 75#  3x20   65#   3 x 15   Increase NV 75#    3x15 75#  3x15 75# 3x20           Hamstring Curls Blue Tband  20x Sulaiman     Blue t-band  X20 sulaiman                      LAQ  60# 3x10 Sulaiman 35#   Increase NV   3 x 15 45#   3x10 sulaiman 45#  3x10 sulaiman 45# 3x10 Sulaiman "   Hip add         Hip abd         Prone SLR        SAQ        S/L SLR                Self HS Stretch (2 way)  Reviewed seated piriformis stretch for HEP       Self Gastroc Stretch                                                 Ther Activity                             Gait Training                             Modalities         CP                    Access Code: [email or web address]  URL: [email or web address]  Date: 10/10/2024  Prepared by: Clara Claudio    Exercises  - Supine Bridge  - 2-3 x daily - 7 x weekly - 2 sets - 10 reps  - Seated Hamstring Stretch  - 2-3 x daily - 7 x weekly - 3 sets - 3-4 reps - 30sec hold  - Seated Table Hamstring Stretch  - 2-3 x daily - 7 x weekly - 1 sets - 3-4 reps - 30sec hold  - Gastroc Stretch on Wall  - 2-3 x daily - 7 x weekly - 1 sets - 3-4 reps - 30sec hold  - Standing Hamstring Curl with Resistance  - 2-3 x daily - 7 x weekly - 2 sets - 10 reps

## 2024-12-16 ENCOUNTER — OFFICE VISIT (OUTPATIENT)
Dept: PHYSICAL THERAPY | Facility: HOME HEALTHCARE | Age: 49
End: 2024-12-16
Payer: COMMERCIAL

## 2024-12-16 DIAGNOSIS — S76.311A STRAIN OF RIGHT HAMSTRING MUSCLE, INITIAL ENCOUNTER: Primary | ICD-10-CM

## 2024-12-16 DIAGNOSIS — S76.311A STRAIN OF RIGHT HAMSTRING, INITIAL ENCOUNTER: ICD-10-CM

## 2024-12-16 PROCEDURE — 97140 MANUAL THERAPY 1/> REGIONS: CPT | Performed by: PHYSICAL THERAPIST

## 2024-12-16 PROCEDURE — 97110 THERAPEUTIC EXERCISES: CPT | Performed by: PHYSICAL THERAPIST

## 2024-12-18 ENCOUNTER — APPOINTMENT (OUTPATIENT)
Dept: PHYSICAL THERAPY | Facility: HOME HEALTHCARE | Age: 49
End: 2024-12-18
Payer: COMMERCIAL

## 2025-03-09 DIAGNOSIS — I10 PRIMARY HYPERTENSION: ICD-10-CM

## 2025-03-10 RX ORDER — AMLODIPINE BESYLATE 2.5 MG/1
7.5 TABLET ORAL DAILY
Qty: 90 TABLET | Refills: 1 | Status: SHIPPED | OUTPATIENT
Start: 2025-03-10

## 2025-05-05 ENCOUNTER — OFFICE VISIT (OUTPATIENT)
Dept: INTERNAL MEDICINE CLINIC | Facility: CLINIC | Age: 50
End: 2025-05-05
Payer: COMMERCIAL

## 2025-05-05 VITALS
HEIGHT: 70 IN | BODY MASS INDEX: 45.1 KG/M2 | SYSTOLIC BLOOD PRESSURE: 148 MMHG | OXYGEN SATURATION: 95 % | DIASTOLIC BLOOD PRESSURE: 92 MMHG | HEART RATE: 89 BPM | WEIGHT: 315 LBS | TEMPERATURE: 98.2 F

## 2025-05-05 DIAGNOSIS — E66.01 MORBID OBESITY (HCC): ICD-10-CM

## 2025-05-05 DIAGNOSIS — E78.2 MIXED HYPERLIPIDEMIA: ICD-10-CM

## 2025-05-05 DIAGNOSIS — Z00.00 ANNUAL PHYSICAL EXAM: ICD-10-CM

## 2025-05-05 DIAGNOSIS — R73.02 IMPAIRED GLUCOSE TOLERANCE: ICD-10-CM

## 2025-05-05 DIAGNOSIS — I10 PRIMARY HYPERTENSION: Primary | ICD-10-CM

## 2025-05-05 PROCEDURE — 99213 OFFICE O/P EST LOW 20 MIN: CPT | Performed by: FAMILY MEDICINE

## 2025-05-05 PROCEDURE — 99396 PREV VISIT EST AGE 40-64: CPT | Performed by: FAMILY MEDICINE

## 2025-05-05 NOTE — PROGRESS NOTES
Adult Annual Physical  Name: Grey Miller      : 1975      MRN: [id number]  Encounter Provider: Beth Oquendo MD  Encounter Date: 2025   Encounter department: [facility]    :  Assessment & Plan  Primary hypertension  Blood pressure r discussed potentially increasing the dose of the amlodipine but he wishes to hold off at present.  Follow-up blood pressure at home if possible.  Watch salt intake.  Remain as active as possible.  Elatively well-controlled.  Did decrease to 134/84.  Continue with the lisinopril/hydrochlorothiazide and amlodipine.  Orders:  •  CBC and differential; Future  •  Comprehensive metabolic panel; Future    Impaired glucose tolerance  Watch carbohydrate intake.  Slow but steady weight loss recommended.  Increase activity level.  Orders:  •  CBC and differential; Future  •  Comprehensive metabolic panel; Future  •  Hemoglobin A1C; Future    Mixed hyperlipidemia  Slip given for laboratory testing.  Watch diet.  Increase fiber in diet.  Orders:  •  CBC and differential; Future  •  Comprehensive metabolic panel; Future  •  Lipid panel; Future  •  TSH, 3rd generation; Future    Morbid obesity (HCC)  Slow but steady weight loss recommended.  Portion control and food choices discussed.  Watch carbohydrate intake.      Orders:  •  CBC and differential; Future  •  Hemoglobin A1C; Future  •  TSH, 3rd generation; Future    Annual physical exam  Slip given for routine laboratory testing.  Up-to-date on Cologuard.  Discussed PSA testing over the age of 50.           Preventive Screenings:    - Cholesterol Screening: screening not indicated and has hyperlipidemia   - Colon cancer screening: screening up-to-date   - Lung cancer screening: screening not indicated   - Prostate cancer screening: screening not indicated     Immunizations:  - Immunizations due: Prevnar 20    Counseling/Anticipatory Guidance:  - Alcohol: discussed moderation in alcohol intake and  recommendations for healthy alcohol use.   - Dental health: discussed importance of regular tooth brushing, flossing, and dental visits.   - Diet: discussed recommendations for a healthy/well-balanced diet.   - Exercise: the importance of regular exercise/physical activity was discussed. Recommend exercise 3-5 times per week for at least 30 minutes.   - Injury prevention: discussed safety/seat belts, safety helmets, smoke detectors, carbon monoxide detectors, and smoking near bedding or upholstery.       Depression Screening and Follow-up Plan: Patient was screened for depression during today's encounter. They screened negative with a PHQ-2 score of 0.          History of Present Illness     Adult Annual Physical:  Patient presents for annual physical. He presents for routine follow-up as well as wellness visit.  Has generally been doing relatively well.  Continues to be active around the community.  Has had some joint aches especially in the left knee area.  Denies any definite injury.  Tolerating his blood pressure medications without difficulty.  Denies any headaches or localized weakness.  Denies any chest pain or palpitations.  Appetite has been generally stable.  No chest pain or palpitations.  Tries to watch his carbohydrate intake.  Usually sleeps relatively well..     Diet and Physical Activity:  - Diet/Nutrition: no special diet.  - Exercise: no formal exercise.    Depression Screening:  - PHQ-2 Score: 0    General Health:  - Sleep: sleeps well, 7-8 hours of sleep on average and 4-6 hours of sleep on average.  - Hearing: normal hearing bilateral ears.  - Vision: no vision problems.  - Dental: regular dental visits.     Health:  - History of STDs: no.   - Urinary symptoms: none.     Advanced Care Planning:  - Has an advanced directive?: no    - Has a durable medical POA?: no      Review of Systems   Constitutional:  Positive for fatigue. Negative for activity change, appetite change, chills and fever.  "  HENT:  Negative for hearing loss.    Eyes:  Negative for pain and visual disturbance.   Respiratory:  Negative for chest tightness and shortness of breath.    Cardiovascular:  Negative for chest pain and palpitations.   Gastrointestinal:  Negative for abdominal pain, blood in stool, diarrhea, nausea and vomiting.   Endocrine: Negative for polydipsia, polyphagia and polyuria.   Genitourinary:  Negative for dysuria.   Musculoskeletal:  Positive for arthralgias. Negative for gait problem.   Skin:  Negative for color change.   Neurological:  Negative for dizziness and headaches.   Hematological:  Does not bruise/bleed easily.   Psychiatric/Behavioral:  Negative for behavioral problems. The patient is not nervous/anxious.          Objective   /92 (BP Location: Left arm, Patient Position: Sitting, Cuff Size: Large)   Pulse 89   Temp 98.2 °F (36.8 °C)   Ht 5' 10\" (1.778 m)   Wt (!) 154 kg (340 lb)   SpO2 95%   BMI 48.78 kg/m²     Physical Exam  Vitals and nursing note reviewed.   Constitutional:       General: He is not in acute distress.     Appearance: He is well-developed and well-groomed. He is morbidly obese.   HENT:      Head: Normocephalic and atraumatic.      Nose: Nose normal.   Eyes:      Conjunctiva/sclera: Conjunctivae normal.      Pupils: Pupils are equal, round, and reactive to light.   Cardiovascular:      Rate and Rhythm: Normal rate and regular rhythm.      Heart sounds: Normal heart sounds. No murmur heard.     No friction rub. No gallop.   Pulmonary:      Breath sounds: No wheezing or rales.   Chest:      Chest wall: No tenderness.   Abdominal:      General: There is no distension.      Tenderness: There is no abdominal tenderness. There is no guarding or rebound.   Musculoskeletal:      Comments: Slight degenerative changes bilateral knees left greater than right   Lymphadenopathy:      Cervical: No cervical adenopathy.   Skin:     General: Skin is warm and dry.   Neurological:      Mental " Status: He is alert and oriented to person, place, and time.   Psychiatric:         Mood and Affect: Mood and affect normal.         Speech: Speech normal.         Behavior: Behavior normal. Behavior is cooperative.         Cognition and Memory: Cognition and memory normal.

## 2025-05-06 NOTE — ASSESSMENT & PLAN NOTE
Blood pressure r discussed potentially increasing the dose of the amlodipine but he wishes to hold off at present.  Follow-up blood pressure at home if possible.  Watch salt intake.  Remain as active as possible.  Elatively well-controlled.  Did decrease to 134/84.  Continue with the lisinopril/hydrochlorothiazide and amlodipine.  Orders:  •  CBC and differential; Future  •  Comprehensive metabolic panel; Future  
Slip given for laboratory testing.  Watch diet.  Increase fiber in diet.  Orders:  •  CBC and differential; Future  •  Comprehensive metabolic panel; Future  •  Lipid panel; Future  •  TSH, 3rd generation; Future  
Slow but steady weight loss recommended.  Portion control and food choices discussed.  Watch carbohydrate intake.      Orders:  •  CBC and differential; Future  •  Hemoglobin A1C; Future  •  TSH, 3rd generation; Future  
Watch carbohydrate intake.  Slow but steady weight loss recommended.  Increase activity level.  Orders:  •  CBC and differential; Future  •  Comprehensive metabolic panel; Future  •  Hemoglobin A1C; Future  
Detail Level: Detailed

## 2025-05-06 NOTE — PATIENT INSTRUCTIONS
"Patient Education     Routine physical for adults   The Basics   Written by the doctors and editors at [facility]   What is a physical? -- A physical is a routine visit, or \"check-up,\" with your doctor. You might also hear it called a \"wellness visit\" or \"preventive visit.\"  During each visit, the doctor will:   Ask about your physical and mental health   Ask about your habits, behaviors, and lifestyle   Do an exam   Give you vaccines if needed   Talk to you about any medicines you take   Give advice about your health   Answer your questions  Getting regular check-ups is an important part of taking care of your health. It can help your doctor find and treat any problems you have. But it's also important for preventing health problems.  A routine physical is different from a \"sick visit.\" A sick visit is when you see a doctor because of a health concern or problem. Since physicals are scheduled ahead of time, you can think about what you want to ask the doctor.  How often should I get a physical? -- It depends on your age and health. In general, for people age 21 years and older:   If you are younger than 50 years, you might be able to get a physical every 3 years.   If you are 50 years or older, your doctor might recommend a physical every year.  If you have an ongoing health condition, like diabetes or high blood pressure, your doctor will probably want to see you more often.  What happens during a physical? -- In general, each visit will include:   Physical exam - The doctor or nurse will check your height, weight, heart rate, and blood pressure. They will also look at your eyes and ears. They will ask about how you are feeling and whether you have any symptoms that bother you.   Medicines - It's a good idea to bring a list of all the medicines you take to each doctor visit. Your doctor will talk to you about your medicines and answer any questions. Tell them if you are having any side effects that bother you. You " "should also tell them if you are having trouble paying for any of your medicines.   Habits and behaviors - This includes:   Your diet   Your exercise habits   Whether you smoke, drink alcohol, or use drugs   Whether you are sexually active   Whether you feel safe at home  Your doctor will talk to you about things you can do to improve your health and lower your risk of health problems. They will also offer help and support. For example, if you want to quit smoking, they can give you advice and might prescribe medicines. If you want to improve your diet or get more physical activity, they can help you with this, too.   Lab tests, if needed - The tests you get will depend on your age and situation. For example, your doctor might want to check your:   Cholesterol   Blood sugar   Iron level   Vaccines - The recommended vaccines will depend on your age, health, and what vaccines you already had. Vaccines are very important because they can prevent certain serious or deadly infections.   Discussion of screening - \"Screening\" means checking for diseases or other health problems before they cause symptoms. Your doctor can recommend screening based on your age, risk, and preferences. This might include tests to check for:   Cancer, such as breast, prostate, cervical, ovarian, colorectal, prostate, lung, or skin cancer   Sexually transmitted infections, such as chlamydia and gonorrhea   Mental health conditions like depression and anxiety  Your doctor will talk to you about the different types of screening tests. They can help you decide which screenings to have. They can also explain what the results might mean.   Answering questions - The physical is a good time to ask the doctor or nurse questions about your health. If needed, they can refer you to other doctors or specialists, too.  Adults older than 65 years often need other care, too. As you get older, your doctor will talk to you about:   How to prevent falling at " home   Hearing or vision tests   Memory testing   How to take your medicines safely   Making sure that you have the help and support you need at home  All topics are updated as new evidence becomes available and our peer review process is complete.  This topic retrieved from [employer] on: May 02, 2024.  Topic 788254 Version 1.0  Release: 32.4.3 - C32.122  © 2024 UpToDate, Inc. and/or its affiliates. All rights reserved.  Consumer Information Use and Disclaimer   Disclaimer: This generalized information is a limited summary of diagnosis, treatment, and/or medication information. It is not meant to be comprehensive and should be used as a tool to help the user understand and/or assess potential diagnostic and treatment options. It does NOT include all information about conditions, treatments, medications, side effects, or risks that may apply to a specific patient. It is not intended to be medical advice or a substitute for the medical advice, diagnosis, or treatment of a health care provider based on the health care provider's examination and assessment of a patient's specific and unique circumstances. Patients must speak with a health care provider for complete information about their health, medical questions, and treatment options, including any risks or benefits regarding use of medications. This information does not endorse any treatments or medications as safe, effective, or approved for treating a specific patient. UpToDate, Inc. and its affiliates disclaim any warranty or liability relating to this information or the use thereof.The use of this information is governed by the Terms of Use, available at https://www.woltersFillmuwer.com/en/know/clinical-effectiveness-terms. 2024© UpToDate, Inc. and its affiliates and/or licensors. All rights reserved.  Copyright   © 2024 Up[employer]Date, Inc. and/or its affiliates. All rights reserved.

## 2025-05-13 DIAGNOSIS — I10 PRIMARY HYPERTENSION: ICD-10-CM

## 2025-05-13 RX ORDER — AMLODIPINE BESYLATE 2.5 MG/1
7.5 TABLET ORAL DAILY
Qty: 90 TABLET | Refills: 5 | Status: SHIPPED | OUTPATIENT
Start: 2025-05-13

## 2025-07-06 DIAGNOSIS — I10 ESSENTIAL HYPERTENSION: ICD-10-CM

## 2025-07-08 RX ORDER — LISINOPRIL AND HYDROCHLOROTHIAZIDE 20; 25 MG/1; MG/1
1 TABLET ORAL DAILY
Qty: 30 TABLET | Refills: 0 | Status: SHIPPED | OUTPATIENT
Start: 2025-07-08

## 2025-08-06 DIAGNOSIS — I10 ESSENTIAL HYPERTENSION: ICD-10-CM

## 2025-08-08 RX ORDER — LISINOPRIL AND HYDROCHLOROTHIAZIDE 20; 25 MG/1; MG/1
1 TABLET ORAL DAILY
Qty: 30 TABLET | Refills: 1 | Status: SHIPPED | OUTPATIENT
Start: 2025-08-08